# Patient Record
Sex: MALE | Race: WHITE | NOT HISPANIC OR LATINO | URBAN - METROPOLITAN AREA
[De-identification: names, ages, dates, MRNs, and addresses within clinical notes are randomized per-mention and may not be internally consistent; named-entity substitution may affect disease eponyms.]

---

## 2020-11-19 ENCOUNTER — INPATIENT (INPATIENT)
Facility: HOSPITAL | Age: 50
LOS: 3 days | Discharge: NURSING, REHAB OR LONG-TERM CARE | DRG: 720 | End: 2020-11-23
Attending: INTERNAL MEDICINE | Admitting: INTERNAL MEDICINE
Payer: MEDICAID

## 2020-11-19 VITALS — HEIGHT: 70 IN | WEIGHT: 154.98 LBS

## 2020-11-19 LAB
ALBUMIN SERPL ELPH-MCNC: 2.6 G/DL — LOW (ref 3.3–5)
ALP SERPL-CCNC: 37 U/L — LOW (ref 40–120)
ALT FLD-CCNC: 58 U/L — SIGNIFICANT CHANGE UP (ref 12–78)
ANION GAP SERPL CALC-SCNC: 5 MMOL/L — SIGNIFICANT CHANGE UP (ref 5–17)
APTT BLD: 33.8 SEC — SIGNIFICANT CHANGE UP (ref 27.5–35.5)
AST SERPL-CCNC: 45 U/L — HIGH (ref 15–37)
BASOPHILS # BLD AUTO: 0.03 K/UL — SIGNIFICANT CHANGE UP (ref 0–0.2)
BASOPHILS NFR BLD AUTO: 0.3 % — SIGNIFICANT CHANGE UP (ref 0–2)
BILIRUB SERPL-MCNC: 0.8 MG/DL — SIGNIFICANT CHANGE UP (ref 0.2–1.2)
BUN SERPL-MCNC: 8 MG/DL — SIGNIFICANT CHANGE UP (ref 7–23)
CALCIUM SERPL-MCNC: 9.1 MG/DL — SIGNIFICANT CHANGE UP (ref 8.5–10.1)
CHLORIDE SERPL-SCNC: 101 MMOL/L — SIGNIFICANT CHANGE UP (ref 96–108)
CO2 SERPL-SCNC: 28 MMOL/L — SIGNIFICANT CHANGE UP (ref 22–31)
CREAT SERPL-MCNC: 0.88 MG/DL — SIGNIFICANT CHANGE UP (ref 0.5–1.3)
EOSINOPHIL # BLD AUTO: 0 K/UL — SIGNIFICANT CHANGE UP (ref 0–0.5)
EOSINOPHIL NFR BLD AUTO: 0 % — SIGNIFICANT CHANGE UP (ref 0–6)
GLUCOSE SERPL-MCNC: 147 MG/DL — HIGH (ref 70–99)
HCT VFR BLD CALC: 31.1 % — LOW (ref 39–50)
HGB BLD-MCNC: 9.9 G/DL — LOW (ref 13–17)
IMM GRANULOCYTES NFR BLD AUTO: 0.9 % — SIGNIFICANT CHANGE UP (ref 0–1.5)
INR BLD: 1.44 RATIO — HIGH (ref 0.88–1.16)
LACTATE SERPL-SCNC: 2.7 MMOL/L — HIGH (ref 0.7–2)
LYMPHOCYTES # BLD AUTO: 1.41 K/UL — SIGNIFICANT CHANGE UP (ref 1–3.3)
LYMPHOCYTES # BLD AUTO: 12.7 % — LOW (ref 13–44)
MCHC RBC-ENTMCNC: 28.8 PG — SIGNIFICANT CHANGE UP (ref 27–34)
MCHC RBC-ENTMCNC: 31.8 GM/DL — LOW (ref 32–36)
MCV RBC AUTO: 90.4 FL — SIGNIFICANT CHANGE UP (ref 80–100)
MONOCYTES # BLD AUTO: 0.51 K/UL — SIGNIFICANT CHANGE UP (ref 0–0.9)
MONOCYTES NFR BLD AUTO: 4.6 % — SIGNIFICANT CHANGE UP (ref 2–14)
NEUTROPHILS # BLD AUTO: 9.04 K/UL — HIGH (ref 1.8–7.4)
NEUTROPHILS NFR BLD AUTO: 81.5 % — HIGH (ref 43–77)
PLATELET # BLD AUTO: 351 K/UL — SIGNIFICANT CHANGE UP (ref 150–400)
POTASSIUM SERPL-MCNC: 4.2 MMOL/L — SIGNIFICANT CHANGE UP (ref 3.5–5.3)
POTASSIUM SERPL-SCNC: 4.2 MMOL/L — SIGNIFICANT CHANGE UP (ref 3.5–5.3)
PROT SERPL-MCNC: 8 GM/DL — SIGNIFICANT CHANGE UP (ref 6–8.3)
PROTHROM AB SERPL-ACNC: 16.4 SEC — HIGH (ref 10.6–13.6)
RBC # BLD: 3.44 M/UL — LOW (ref 4.2–5.8)
RBC # FLD: 15.2 % — HIGH (ref 10.3–14.5)
SODIUM SERPL-SCNC: 134 MMOL/L — LOW (ref 135–145)
WBC # BLD: 11.09 K/UL — HIGH (ref 3.8–10.5)
WBC # FLD AUTO: 11.09 K/UL — HIGH (ref 3.8–10.5)

## 2020-11-19 PROCEDURE — 93010 ELECTROCARDIOGRAM REPORT: CPT

## 2020-11-19 PROCEDURE — 71045 X-RAY EXAM CHEST 1 VIEW: CPT | Mod: 26

## 2020-11-19 RX ORDER — SODIUM CHLORIDE 9 MG/ML
1000 INJECTION INTRAMUSCULAR; INTRAVENOUS; SUBCUTANEOUS ONCE
Refills: 0 | Status: COMPLETED | OUTPATIENT
Start: 2020-11-19 | End: 2020-11-19

## 2020-11-19 RX ORDER — ACETAMINOPHEN 500 MG
650 TABLET ORAL ONCE
Refills: 0 | Status: COMPLETED | OUTPATIENT
Start: 2020-11-19 | End: 2020-11-19

## 2020-11-19 RX ADMIN — SODIUM CHLORIDE 1000 MILLILITER(S): 9 INJECTION INTRAMUSCULAR; INTRAVENOUS; SUBCUTANEOUS at 23:56

## 2020-11-19 RX ADMIN — SODIUM CHLORIDE 1000 MILLILITER(S): 9 INJECTION INTRAMUSCULAR; INTRAVENOUS; SUBCUTANEOUS at 22:56

## 2020-11-19 RX ADMIN — SODIUM CHLORIDE 1000 MILLILITER(S): 9 INJECTION INTRAMUSCULAR; INTRAVENOUS; SUBCUTANEOUS at 23:57

## 2020-11-19 RX ADMIN — SODIUM CHLORIDE 1000 MILLILITER(S): 9 INJECTION INTRAMUSCULAR; INTRAVENOUS; SUBCUTANEOUS at 22:57

## 2020-11-19 RX ADMIN — Medication 650 MILLIGRAM(S): at 23:20

## 2020-11-19 NOTE — ED PROVIDER NOTE - CLINICAL SUMMARY MEDICAL DECISION MAKING FREE TEXT BOX
vague complaints of generalized weakness and questionable hyponatremia, awaiting labs, xray.  tachycardic and low bp, will hydrate, treat fever

## 2020-11-19 NOTE — ED PROVIDER NOTE - CARE PLAN
Principal Discharge DX:	Hypotension  Secondary Diagnosis:	Sepsis  Secondary Diagnosis:	Elevated troponin

## 2020-11-19 NOTE — ED PROVIDER NOTE - CPE EDP EYES NORM
-- DO NOT REPLY / DO NOT REPLY ALL --  -- Message is from the [facility]--    General Patient Message      Reason for Call: Nel therapist is calling with mom present in regards to MRI order. Patient has an appointment This wednesdays 1/29/2020 for MRI. Order for MRI was denied by doctor. Please contact as soon as possible.     Mom's telephone number: [phone]  Nel therapist telephone number: [phone]    Caller Information       Type Contact Phone    01/27/2020 09:42 AM Phone (Incoming) NEL (Other) [phone]     THERAPIST          Alternative phone number: none    Turnaround time given to caller:   \"This message will be sent to [state Provider's name]. The clinical team will fulfill your request as soon as they review your message.\"}     normal...

## 2020-11-19 NOTE — ED PROVIDER NOTE - PROGRESS NOTE DETAILS
pt remains hypotensive, despite 2L NS and a third running. Patient is asymptomatic, no c/o dizziness, chest pain, sob.  In CT for more information.  Will start Levophed and call ICU consult d/w Dr Montoya, intensivist; will admit to ICU on Levophed

## 2020-11-19 NOTE — ED ADULT NURSE NOTE - OBJECTIVE STATEMENT
patient axox3, c/o abnormal labs. as per patient, patient had outpatient labs completed and found to have low sodium, patient unsure of sodium result. patient c/o weakness. patient denies headache, n/v/d, chills, cough, chest pain, SOB. patient axox3, c/o abnormal labs. as per patient, patient had outpatient labs completed and found to have low sodium, patient unsure of sodium result. patient c/o increasing weakness. patient denies headache, n/v/d, chills, cough, chest pain, SOB. patient states he was recently seen at [facility] for "bubbling" in his stomach, patient states his stools are light in color. patient denies abdominal pain.

## 2020-11-19 NOTE — ED STATDOCS - PROGRESS NOTE DETAILS
Esteban Lazcano for attending Dr. Bourne: 49 y/o male with no significant PMHx presents to the ED regarding abnormal labs. Pt was seen at [facility] several days ago for "bubbles in my stomach." Pt had outpatient labs, found to have low sodium. Denies current abd pain or any symptoms. Given pt febrile and tachycardic, will send pt to main ED for further evaluation.

## 2020-11-19 NOTE — ED PROVIDER NOTE - OBJECTIVE STATEMENT
51yo male with h/o asthma c/o generalized weakness. Patient states that for the past 3 months he has had a sensation of "bubbling" in the stomach with bloating and increased belching, loss of appetite and a weight loss of 20lb.  No abdominal pain.  Noticed his stools were light in color and floated.  On October 18 he started to have increased general malaise and fatigue.  +cough.  No vomiting or diarrhea.  No known fever.  He went to [facility] on Tuesday, was told he needed to come immediately to the ED for IVF because his sodium was low, but didn't have a ride, so he came today.  Former smoker, still smokes "sometimes"; denies alcohol and drug use. Has no PMD

## 2020-11-19 NOTE — ED ADULT TRIAGE NOTE - CHIEF COMPLAINT QUOTE
Patient presents to ED complaining of low Na on outpatient labs. Pt endorses feeling weak. Denies chest pain, SOB.

## 2020-11-20 DIAGNOSIS — I95.9 HYPOTENSION, UNSPECIFIED: ICD-10-CM

## 2020-11-20 LAB
-  STREPTOCOCCUS SP. (NOT GRP A, B OR S PNEUMONIAE): SIGNIFICANT CHANGE UP
ADD ON TEST-SPECIMEN IN LAB: SIGNIFICANT CHANGE UP
ADD ON TEST-SPECIMEN IN LAB: SIGNIFICANT CHANGE UP
ALBUMIN SERPL ELPH-MCNC: 2.2 G/DL — LOW (ref 3.3–5)
ALP SERPL-CCNC: 36 U/L — LOW (ref 40–120)
ALT FLD-CCNC: 46 U/L — SIGNIFICANT CHANGE UP (ref 12–78)
ANION GAP SERPL CALC-SCNC: 6 MMOL/L — SIGNIFICANT CHANGE UP (ref 5–17)
APPEARANCE UR: CLEAR — SIGNIFICANT CHANGE UP
AST SERPL-CCNC: 38 U/L — HIGH (ref 15–37)
BACTERIA # UR AUTO: ABNORMAL
BASOPHILS # BLD AUTO: 0.03 K/UL — SIGNIFICANT CHANGE UP (ref 0–0.2)
BASOPHILS NFR BLD AUTO: 0.3 % — SIGNIFICANT CHANGE UP (ref 0–2)
BILIRUB SERPL-MCNC: 0.7 MG/DL — SIGNIFICANT CHANGE UP (ref 0.2–1.2)
BILIRUB UR-MCNC: NEGATIVE — SIGNIFICANT CHANGE UP
BUN SERPL-MCNC: 5 MG/DL — LOW (ref 7–23)
CALCIUM SERPL-MCNC: 8.7 MG/DL — SIGNIFICANT CHANGE UP (ref 8.5–10.1)
CHLORIDE SERPL-SCNC: 109 MMOL/L — HIGH (ref 96–108)
CO2 SERPL-SCNC: 25 MMOL/L — SIGNIFICANT CHANGE UP (ref 22–31)
COLOR SPEC: YELLOW — SIGNIFICANT CHANGE UP
CREAT SERPL-MCNC: 0.5 MG/DL — SIGNIFICANT CHANGE UP (ref 0.5–1.3)
DIFF PNL FLD: NEGATIVE — SIGNIFICANT CHANGE UP
EOSINOPHIL # BLD AUTO: 0 K/UL — SIGNIFICANT CHANGE UP (ref 0–0.5)
EOSINOPHIL NFR BLD AUTO: 0 % — SIGNIFICANT CHANGE UP (ref 0–6)
EPI CELLS # UR: NEGATIVE — SIGNIFICANT CHANGE UP
GLUCOSE SERPL-MCNC: 125 MG/DL — HIGH (ref 70–99)
GLUCOSE UR QL: NEGATIVE MG/DL — SIGNIFICANT CHANGE UP
GRAM STN FLD: SIGNIFICANT CHANGE UP
GRAM STN FLD: SIGNIFICANT CHANGE UP
HCT VFR BLD CALC: 30 % — LOW (ref 39–50)
HGB BLD-MCNC: 9.2 G/DL — LOW (ref 13–17)
IMM GRANULOCYTES NFR BLD AUTO: 1.2 % — SIGNIFICANT CHANGE UP (ref 0–1.5)
KETONES UR-MCNC: NEGATIVE — SIGNIFICANT CHANGE UP
LACTATE SERPL-SCNC: 1 MMOL/L — SIGNIFICANT CHANGE UP (ref 0.7–2)
LEUKOCYTE ESTERASE UR-ACNC: NEGATIVE — SIGNIFICANT CHANGE UP
LYMPHOCYTES # BLD AUTO: 1.21 K/UL — SIGNIFICANT CHANGE UP (ref 1–3.3)
LYMPHOCYTES # BLD AUTO: 10.7 % — LOW (ref 13–44)
MAGNESIUM SERPL-MCNC: 2.1 MG/DL — SIGNIFICANT CHANGE UP (ref 1.6–2.6)
MCHC RBC-ENTMCNC: 27.8 PG — SIGNIFICANT CHANGE UP (ref 27–34)
MCHC RBC-ENTMCNC: 30.7 GM/DL — LOW (ref 32–36)
MCV RBC AUTO: 90.6 FL — SIGNIFICANT CHANGE UP (ref 80–100)
METHOD TYPE: SIGNIFICANT CHANGE UP
MONOCYTES # BLD AUTO: 0.78 K/UL — SIGNIFICANT CHANGE UP (ref 0–0.9)
MONOCYTES NFR BLD AUTO: 6.9 % — SIGNIFICANT CHANGE UP (ref 2–14)
NEUTROPHILS # BLD AUTO: 9.11 K/UL — HIGH (ref 1.8–7.4)
NEUTROPHILS NFR BLD AUTO: 80.9 % — HIGH (ref 43–77)
NITRITE UR-MCNC: NEGATIVE — SIGNIFICANT CHANGE UP
PH UR: 6.5 — SIGNIFICANT CHANGE UP (ref 5–8)
PHOSPHATE SERPL-MCNC: 3.4 MG/DL — SIGNIFICANT CHANGE UP (ref 2.5–4.5)
PLATELET # BLD AUTO: 302 K/UL — SIGNIFICANT CHANGE UP (ref 150–400)
POTASSIUM SERPL-MCNC: 3.8 MMOL/L — SIGNIFICANT CHANGE UP (ref 3.5–5.3)
POTASSIUM SERPL-SCNC: 3.8 MMOL/L — SIGNIFICANT CHANGE UP (ref 3.5–5.3)
PROCALCITONIN SERPL-MCNC: 0.13 NG/ML — HIGH (ref 0.02–0.1)
PROT SERPL-MCNC: 7 GM/DL — SIGNIFICANT CHANGE UP (ref 6–8.3)
PROT UR-MCNC: 15 MG/DL
RAPID RVP RESULT: SIGNIFICANT CHANGE UP
RBC # BLD: 3.31 M/UL — LOW (ref 4.2–5.8)
RBC # FLD: 15.3 % — HIGH (ref 10.3–14.5)
RBC CASTS # UR COMP ASSIST: SIGNIFICANT CHANGE UP /HPF (ref 0–4)
SARS-COV-2 RNA SPEC QL NAA+PROBE: SIGNIFICANT CHANGE UP
SODIUM SERPL-SCNC: 140 MMOL/L — SIGNIFICANT CHANGE UP (ref 135–145)
SP GR SPEC: 1 — LOW (ref 1.01–1.02)
SPECIMEN SOURCE: SIGNIFICANT CHANGE UP
SPECIMEN SOURCE: SIGNIFICANT CHANGE UP
UROBILINOGEN FLD QL: 1 MG/DL
WBC # BLD: 11.26 K/UL — HIGH (ref 3.8–10.5)
WBC # FLD AUTO: 11.26 K/UL — HIGH (ref 3.8–10.5)
WBC UR QL: NEGATIVE — SIGNIFICANT CHANGE UP

## 2020-11-20 PROCEDURE — 87086 URINE CULTURE/COLONY COUNT: CPT

## 2020-11-20 PROCEDURE — 36415 COLL VENOUS BLD VENIPUNCTURE: CPT

## 2020-11-20 PROCEDURE — 93306 TTE W/DOPPLER COMPLETE: CPT | Mod: 26

## 2020-11-20 PROCEDURE — 82533 TOTAL CORTISOL: CPT

## 2020-11-20 PROCEDURE — 74177 CT ABD & PELVIS W/CONTRAST: CPT | Mod: 26

## 2020-11-20 PROCEDURE — 71275 CT ANGIOGRAPHY CHEST: CPT | Mod: 26

## 2020-11-20 PROCEDURE — 93306 TTE W/DOPPLER COMPLETE: CPT

## 2020-11-20 PROCEDURE — 94640 AIRWAY INHALATION TREATMENT: CPT

## 2020-11-20 PROCEDURE — 93312 ECHO TRANSESOPHAGEAL: CPT

## 2020-11-20 PROCEDURE — 70450 CT HEAD/BRAIN W/O DYE: CPT | Mod: 26

## 2020-11-20 PROCEDURE — 83735 ASSAY OF MAGNESIUM: CPT

## 2020-11-20 PROCEDURE — 81001 URINALYSIS AUTO W/SCOPE: CPT

## 2020-11-20 PROCEDURE — 84145 PROCALCITONIN (PCT): CPT

## 2020-11-20 PROCEDURE — 87040 BLOOD CULTURE FOR BACTERIA: CPT

## 2020-11-20 PROCEDURE — 85025 COMPLETE CBC W/AUTO DIFF WBC: CPT

## 2020-11-20 PROCEDURE — 85027 COMPLETE CBC AUTOMATED: CPT

## 2020-11-20 PROCEDURE — 84484 ASSAY OF TROPONIN QUANT: CPT

## 2020-11-20 PROCEDURE — 80053 COMPREHEN METABOLIC PANEL: CPT

## 2020-11-20 PROCEDURE — 80048 BASIC METABOLIC PNL TOTAL CA: CPT

## 2020-11-20 PROCEDURE — 99291 CRITICAL CARE FIRST HOUR: CPT

## 2020-11-20 PROCEDURE — 86769 SARS-COV-2 COVID-19 ANTIBODY: CPT

## 2020-11-20 PROCEDURE — 84100 ASSAY OF PHOSPHORUS: CPT

## 2020-11-20 RX ORDER — CHLORHEXIDINE GLUCONATE 213 G/1000ML
1 SOLUTION TOPICAL
Refills: 0 | Status: DISCONTINUED | OUTPATIENT
Start: 2020-11-20 | End: 2020-11-23

## 2020-11-20 RX ORDER — CEFEPIME 1 G/1
1000 INJECTION, POWDER, FOR SOLUTION INTRAMUSCULAR; INTRAVENOUS EVERY 12 HOURS
Refills: 0 | Status: DISCONTINUED | OUTPATIENT
Start: 2020-11-20 | End: 2020-11-20

## 2020-11-20 RX ORDER — CEFEPIME 1 G/1
1000 INJECTION, POWDER, FOR SOLUTION INTRAMUSCULAR; INTRAVENOUS EVERY 8 HOURS
Refills: 0 | Status: DISCONTINUED | OUTPATIENT
Start: 2020-11-20 | End: 2020-11-21

## 2020-11-20 RX ORDER — CEFEPIME 1 G/1
1000 INJECTION, POWDER, FOR SOLUTION INTRAMUSCULAR; INTRAVENOUS ONCE
Refills: 0 | Status: COMPLETED | OUTPATIENT
Start: 2020-11-20 | End: 2020-11-20

## 2020-11-20 RX ORDER — CEFEPIME 1 G/1
1000 INJECTION, POWDER, FOR SOLUTION INTRAMUSCULAR; INTRAVENOUS EVERY 8 HOURS
Refills: 0 | Status: DISCONTINUED | OUTPATIENT
Start: 2020-11-20 | End: 2020-11-20

## 2020-11-20 RX ORDER — ALBUTEROL 90 UG/1
1 AEROSOL, METERED ORAL EVERY 6 HOURS
Refills: 0 | Status: DISCONTINUED | OUTPATIENT
Start: 2020-11-20 | End: 2020-11-23

## 2020-11-20 RX ORDER — ENOXAPARIN SODIUM 100 MG/ML
40 INJECTION SUBCUTANEOUS DAILY
Refills: 0 | Status: DISCONTINUED | OUTPATIENT
Start: 2020-11-20 | End: 2020-11-23

## 2020-11-20 RX ORDER — CEFEPIME 1 G/1
INJECTION, POWDER, FOR SOLUTION INTRAMUSCULAR; INTRAVENOUS
Refills: 0 | Status: DISCONTINUED | OUTPATIENT
Start: 2020-11-20 | End: 2020-11-20

## 2020-11-20 RX ORDER — SODIUM CHLORIDE 9 MG/ML
1000 INJECTION, SOLUTION INTRAVENOUS
Refills: 0 | Status: DISCONTINUED | OUTPATIENT
Start: 2020-11-20 | End: 2020-11-21

## 2020-11-20 RX ORDER — VANCOMYCIN HCL 1 G
1000 VIAL (EA) INTRAVENOUS ONCE
Refills: 0 | Status: COMPLETED | OUTPATIENT
Start: 2020-11-20 | End: 2020-11-20

## 2020-11-20 RX ORDER — NOREPINEPHRINE BITARTRATE/D5W 8 MG/250ML
0.05 PLASTIC BAG, INJECTION (ML) INTRAVENOUS
Qty: 8 | Refills: 0 | Status: DISCONTINUED | OUTPATIENT
Start: 2020-11-20 | End: 2020-11-23

## 2020-11-20 RX ADMIN — SODIUM CHLORIDE 1000 MILLILITER(S): 9 INJECTION INTRAMUSCULAR; INTRAVENOUS; SUBCUTANEOUS at 00:21

## 2020-11-20 RX ADMIN — CEFEPIME 1000 MILLIGRAM(S): 1 INJECTION, POWDER, FOR SOLUTION INTRAMUSCULAR; INTRAVENOUS at 08:10

## 2020-11-20 RX ADMIN — SODIUM CHLORIDE 125 MILLILITER(S): 9 INJECTION, SOLUTION INTRAVENOUS at 04:00

## 2020-11-20 RX ADMIN — ALBUTEROL 1 PUFF(S): 90 AEROSOL, METERED ORAL at 14:22

## 2020-11-20 RX ADMIN — SODIUM CHLORIDE 125 MILLILITER(S): 9 INJECTION, SOLUTION INTRAVENOUS at 16:35

## 2020-11-20 RX ADMIN — CEFEPIME 1000 MILLIGRAM(S): 1 INJECTION, POWDER, FOR SOLUTION INTRAMUSCULAR; INTRAVENOUS at 16:35

## 2020-11-20 RX ADMIN — Medication 6.59 MICROGRAM(S)/KG/MIN: at 00:53

## 2020-11-20 RX ADMIN — CHLORHEXIDINE GLUCONATE 1 APPLICATION(S): 213 SOLUTION TOPICAL at 06:50

## 2020-11-20 RX ADMIN — CEFEPIME 1000 MILLIGRAM(S): 1 INJECTION, POWDER, FOR SOLUTION INTRAMUSCULAR; INTRAVENOUS at 23:16

## 2020-11-20 RX ADMIN — Medication 250 MILLIGRAM(S): at 01:11

## 2020-11-20 RX ADMIN — ALBUTEROL 1 PUFF(S): 90 AEROSOL, METERED ORAL at 20:18

## 2020-11-20 RX ADMIN — ALBUTEROL 1 PUFF(S): 90 AEROSOL, METERED ORAL at 08:13

## 2020-11-20 RX ADMIN — CEFEPIME 1000 MILLIGRAM(S): 1 INJECTION, POWDER, FOR SOLUTION INTRAMUSCULAR; INTRAVENOUS at 01:10

## 2020-11-20 RX ADMIN — ENOXAPARIN SODIUM 40 MILLIGRAM(S): 100 INJECTION SUBCUTANEOUS at 10:33

## 2020-11-20 RX ADMIN — Medication 650 MILLIGRAM(S): at 01:31

## 2020-11-20 RX ADMIN — Medication 6.59 MICROGRAM(S)/KG/MIN: at 08:10

## 2020-11-20 NOTE — H&P ADULT - ASSESSMENT
Impression:  1. Shock  2. Sepsis  3. Lactic acidosis   4. Elevated troponin   5. Asthma    - s/p IVF resuscitation. Started on levophed, titrating to maintain MAP >65. Ordered for formal TTE.  - Broad spectrum abx w/ cefepime. Received 1 dose Vanc in ED. Send Procal. Blood and urien cultures are pending.  - Meets sepsis criteria, unclear source of sepsis, UA clean/ CXR w/out consolidation /infiltrate. CT C/A/P w/out evidence of source of infection. RVP and COVID-19 negative.   - tylenol PRN for fever   - Elevated troponin, likely demand ischemia. No EKG change. Trend q8hr   - Lactic acidosis, s/p IVFs. Now cleared  - IVF hydration w/ LR   - Restart home bronchodilators

## 2020-11-20 NOTE — H&P ADULT - NEGATIVE NEUROLOGICAL SYMPTOMS
no focal seizures/no syncope/no weakness/no paresthesias/no generalized seizures/no transient paralysis

## 2020-11-20 NOTE — CHART NOTE - NSCHARTNOTEFT_GEN_A_CORE
A provider-focused exam of reperfusion assessment was completed after fluid bolus. Remained hypotensive after fluid resuscitation. Started on levophed.     T(C): 36.6 (11-20-20 @ 04:00), Max: 38 (11-19-20 @ 21:52)  HR: 93 (11-20-20 @ 07:00) (90 - 140)  BP: 109/53 (11-20-20 @ 07:00) (70/32 - 123/55)  RR: 40 (11-20-20 @ 07:00) (18 - 40)  SpO2: 98% (11-20-20 @ 07:00) (94% - 100%)    Resp: clear to auscultation, no rales, wheezing, or rhonchi  Cardiac: S1, S2, no murmurs appreciated    Capillary refill time is less than 2 seconds.     Peripheral pulses are + 2 Radial bilaterally and + 2 DP bilaterally    Skin: normal turgor    Continue present care.

## 2020-11-20 NOTE — ED ADULT NURSE REASSESSMENT NOTE - NS ED NURSE REASSESS COMMENT FT1
patient diaphoretic. bp 86/51. patient denies headache, dizziness, chest pain, SOB. dr. james made aware. levophed to be started. patient safety maintained. will continue to monitor.

## 2020-11-20 NOTE — PROGRESS NOTE ADULT - SUBJECTIVE AND OBJECTIVE BOX
CC:    HPI:  49 y/o male pmhx asthma presented to ED tonight after being told to come by Urgent care for hyponatremia, on arrival to ED was found to be hypotensive. Patient admits to having some non specific symptoms of abdominal bloating, loss of appeitite and almost 20lb weight loss over past 3 weeks. Denies any abdominal pain. States 3 weeks ago, he started having malaise and non productive cough. Denies any SOB/ CP, abdominal pain, N/V, diarehha, fever/chills. In ED hypotensive despite 3L IVF, started on levophed, he was febrile as well. Admitted to ICU.      : PAtient seen in the ICU this morning.  On levophes, Cultures are negative thus far.  Patient admits to a significant wt loss unintentionally        PAST MEDICAL & SURGICAL HISTORY:  Asthma    No significant past surgical history        FAMILY HISTORY:      Social Hx:    Allergies    No Known Allergies    Intolerances        Height (cm): 177.8 ( @ 21:50)  Weight (kg): 70.3 ( @ 21:50)  BMI (kg/m2): 22.2 ( @ 21:50)    ICU Vital Signs Last 24 Hrs  T(C): 36.9 (2020 12:00), Max: 38 (2020 21:52)  T(F): 98.5 (2020 12:00), Max: 100.4 (2020 21:52)  HR: 103 (2020 12:00) (90 - 140)  BP: 92/53 (2020 12:00) (70/32 - 123/66)  BP(mean): 65 (2020 12:00) (45 - 83)  ABP: --  ABP(mean): --  RR: 35 (2020 12:00) (18 - 40)  SpO2: 98% (2020 12:00) (94% - 100%)          I&O's Summary    2020 07:01  -  2020 07:00  --------------------------------------------------------  IN: 0 mL / OUT: 300 mL / NET: -300 mL    2020 07:01  -  2020 12:42  --------------------------------------------------------  IN: 0 mL / OUT: 600 mL / NET: -600 mL                              9.2    11.26 )-----------( 302      ( 2020 06:38 )             30.0       11-20    140  |  109<H>  |  5<L>  ----------------------------<  125<H>  3.8   |  25  |  0.50    Ca    8.7      2020 06:38  Phos  3.4       Mg     2.1         TPro  7.0  /  Alb  2.2<L>  /  TBili  0.7  /  DBili  x   /  AST  38<H>  /  ALT  46  /  AlkPhos  36<L>  11-20      CARDIAC MARKERS ( 2020 06:38 )  0.124 ng/mL / x     / x     / x     / x      CARDIAC MARKERS ( 2020 22:12 )  0.067 ng/mL / x     / x     / x     / x                Urinalysis Basic - ( 2020 01:27 )    Color: Yellow / Appearance: Clear / S.005 / pH: x  Gluc: x / Ketone: Negative  / Bili: Negative / Urobili: 1 mg/dL   Blood: x / Protein: 15 mg/dL / Nitrite: Negative   Leuk Esterase: Negative / RBC: 0-2 /HPF / WBC Negative   Sq Epi: x / Non Sq Epi: Negative / Bacteria: Occasional        MEDICATIONS  (STANDING):  ALBUTerol    90 MICROgram(s) HFA Inhaler 1 Puff(s) Inhalation every 6 hours  cefepime  Injectable. 1000 milliGRAM(s) IV Push every 8 hours  chlorhexidine 4% Liquid 1 Application(s) Topical <User Schedule>  enoxaparin Injectable 40 milliGRAM(s) SubCutaneous daily  lactated ringers. 1000 milliLiter(s) (125 mL/Hr) IV Continuous <Continuous>  norepinephrine Infusion 0.05 MICROgram(s)/kG/Min (6.59 mL/Hr) IV Continuous <Continuous>    MEDICATIONS  (PRN):      DVT Prophylaxis:    Advanced Directives:  Discussed with:    Visit Information: 30 min    ** Time is exclusive of billed procedures and/or teaching and/or routine family updates.

## 2020-11-20 NOTE — CONSULT NOTE ADULT - SUBJECTIVE AND OBJECTIVE BOX
REASON FOR CONSULT: Hypotension, Sepsis    Chief Complaint    HPI:  · Chief Complaint: The patient is a 50y Male complaining of abnormal lab result.  · HPI Objective Statement: 51yo male with h/o asthma c/o generalized weakness. Patient states that for the past 3 months he has had a sensation of "bubbling" in the stomach with bloating and increased belching, loss of appetite and a weight loss of 20lb.  No abdominal pain.  Noticed his stools were light in color and floated.  On October 18 he started to have increased general malaise and fatigue.  +cough.  No vomiting or diarrhea.  No known fever.  He went to [facility] on Tuesday, was told he needed to come immediately to the ED for IVF because his sodium was low, but didn't have a ride, so he came today.  Former smoker, still smokes "sometimes"; denies alcohol and drug use. Has no PMD  Case was discussed with the ER MD and ICU PA.    PAST MEDICAL & SURGICAL HISTORY:  Asthma    No significant past surgical history      Allergies  No Known Allergies        Medications:  cefepime  Injectable.      cefepime  Injectable. 1000 milliGRAM(s) IV Push every 12 hours  norepinephrine Infusion 0.05 MICROgram(s)/kG/Min IV Continuous <Continuous>  Vancomycin        Vital Signs Last 24 Hrs  T(C): 36.8 (20 Nov 2020 01:31), Max: 38 (19 Nov 2020 21:52)  T(F): 98.3 (20 Nov 2020 01:31), Max: 100.4 (19 Nov 2020 21:52)  HR: 101 (20 Nov 2020 00:52) (101 - 140)  BP: 86/51 (20 Nov 2020 00:52) (83/43 - 103/61)  BP(mean): 62 (20 Nov 2020 00:52) (56 - 74)  RR: 23 (19 Nov 2020 23:56) (18 - 23)  SpO2: 100% (19 Nov 2020 23:56) (95% - 100%)        LABS:                        9.9    11.09 )-----------( 351      ( 19 Nov 2020 22:12 )             31.1     11-19    134<L>  |  101  |  8   ----------------------------<  147<H>  4.2   |  28  |  0.88    Ca    9.1      19 Nov 2020 22:12    TPro  8.0  /  Alb  2.6<L>  /  TBili  0.8  /  DBili  x   /  AST  45<H>  /  ALT  58  /  AlkPhos  37<L>  11-19      CARDIAC MARKERS ( 19 Nov 2020 22:12 )  0.067 ng/mL / x     / x     / x     / x          PT/INR - ( 19 Nov 2020 22:12 )   PT: 16.4 sec;   INR: 1.44 ratio         PTT - ( 19 Nov 2020 22:12 )  PTT:33.8 sec    CULTURES: sent      Physical Examination:  Physical exam as per bedside MD (direct physical examination could not be performed because the visit was provided via Telemedicine):       RADIOLOGY: Results pending.    IMP-  Hypotension  dehydration  sepsis  elevated Troponin and Lactate      Plan-  Levophed for Hemodynamic support  Admit to ICU  Cefepime and Vancomycin empiric  f/u cultures  f/u CT result    CRITICAL CARE TIME SPENT: 40    This visit was provided via telemedicine. Patient was located at     [facility]  Provider was located at [facility] [place] for the visit.

## 2020-11-20 NOTE — H&P ADULT - NSHPLABSRESULTS_GEN_ALL_CORE
< from: CT Abdomen and Pelvis w/ IV Cont (11.20.20 @ 01:12) >    EXAM:  CT ABDOMEN AND PELVIS IC                          EXAM:  CTA CHEST PE PROTOCOL (W)AW IC                            PROCEDURE DATE:  11/20/2020          INTERPRETATION:  CT ANGIO CHEST PULMONARY EMBOLISM WITH IV CONTRAST  CT ABDOMEN AND PELVIS WITH IV CONTRAST    INDICATION: Tachycardia, hypotension and weakness    TECHNIQUE: Contrast enhanced CT imaging of the chest, abdomen and pelvis. Timing optimized for the pulmonary arteries. Sagittal and coronal reformat images are provided. Postprocessed MIP reformatted images were created and reviewed.    90 mL of Omnipaque 350 contrast material was injected IV.    COMPARISON: CT abdomen pelvis 10/5/2003..    FINDINGS:    Pulmonary arteries: No filling defects to suggest pulmonary emboli though detailed evaluation of the subsegmental branches limited secondary to suboptimal contrast bolus within these vessels and respiratory motion artifact.  Aorta: Atherosclerotic disease of the aorta and its branches.    Lines and tubes: None.  Airways:Patent central airways.  Lungs and Pleura: No consolidation, pleural effusion or pneumothorax. Mild bibasilar dependent atelectasis.  Lymph nodes: No mediastinal adenopathy. No hilar or axillary adenopathy.  Heart: Borderline cardiac silhouette. No pericardial effusion.    Liver: Enlarged fatty liver measuring 22 cm in length. Too small to characterize hepatic hypodensities.  Biliary: No dilatation. Partially contracted gallbladder.  Spleen: No suspicious lesions.  Pancreas: No inflammatory changes or ductal dilatation.  Adrenals: Normal.  Kidneys: No hydronephrosis. Too small to characterize bilateral renal hypodensities. Punctate nonobstructive stone in the inferior pole of left kidney.  Vessels: Normal aortic caliber. Atherosclerotic disease of the aorta and its branches.    GI tract: Small hiatal hernia or wall thickening of the distal thoracic esophagus for which nonemergent upper endoscopic evaluation is advised. No evidence of small bowel obstruction. No significant bowel wall thickening or inflammatory changes though detailed evaluation of GI tract is limited secondary to inadequate distention. Mild colonic diverticulosis without evidence of acute diverticular colitis. Normal appendix.    Peritoneum/retroperitoneum and mesentery: No free air. No organized fluid collection. No adenopathy.    Pelvic organs/Bladder: No prostatomegaly. Mild bladder wall thickening, difficult to evaluate secondary to inadequate distention.    Abdominal wall: A small fat containing umbilical andright groin hernia is noted.  Bones and soft tissues: Mild multilevel degenerative changes of the spine noted.    IMPRESSION:    Negative for pulmonary emboli though detailed evaluation of the subsegmental branches limited secondary to suboptimal contrast bolus within these vessels and respiratory motion artifact..    No evidence of pneumonia or pleural effusion.Mild bibasilar dependent atelectasis.    Small hiatal hernia or wall thickening of the distal thoracic esophagus for which nonemergent upper endoscopic evaluation is advised.    No acute bowel inflammatory changes or obstruction.    Normal appendix.    Mild bladder wall thickening, difficult to evaluate secondary to inadequate distention. Correlate with urinalysis and laboratory values to assess for cystitis in appropriate clinical setting.    Additional findings as mentioned above.    < end of copied text >

## 2020-11-20 NOTE — PROGRESS NOTE ADULT - ASSESSMENT
A/P: 50 male who presented with hypotension and presumed to be from sepsis    Plan:  ICU    PULM: No active issues    Cardio: Hypotension may be from septic shock, wean off Levo, Check ECHO, Cardio Consult    Renal: Continue LR at 125    GI: PO Diet, Will need an EGD for CT findings    ID: Cultures still PND, Continue Cefepime

## 2020-11-20 NOTE — H&P ADULT - RS GEN PE MLT RESP DETAILS PC
breath sounds equal/normal/airway patent/clear to auscultation bilaterally/good air movement/respirations non-labored
no

## 2020-11-21 DIAGNOSIS — A40.9 STREPTOCOCCAL SEPSIS, UNSPECIFIED: ICD-10-CM

## 2020-11-21 DIAGNOSIS — I35.1 NONRHEUMATIC AORTIC (VALVE) INSUFFICIENCY: ICD-10-CM

## 2020-11-21 DIAGNOSIS — R77.8 OTHER SPECIFIED ABNORMALITIES OF PLASMA PROTEINS: ICD-10-CM

## 2020-11-21 LAB
ANION GAP SERPL CALC-SCNC: 6 MMOL/L — SIGNIFICANT CHANGE UP (ref 5–17)
BUN SERPL-MCNC: 5 MG/DL — LOW (ref 7–23)
CALCIUM SERPL-MCNC: 8.6 MG/DL — SIGNIFICANT CHANGE UP (ref 8.5–10.1)
CHLORIDE SERPL-SCNC: 104 MMOL/L — SIGNIFICANT CHANGE UP (ref 96–108)
CO2 SERPL-SCNC: 26 MMOL/L — SIGNIFICANT CHANGE UP (ref 22–31)
CREAT SERPL-MCNC: 0.47 MG/DL — LOW (ref 0.5–1.3)
CULTURE RESULTS: SIGNIFICANT CHANGE UP
GLUCOSE SERPL-MCNC: 102 MG/DL — HIGH (ref 70–99)
GRAM STN FLD: SIGNIFICANT CHANGE UP
GRAM STN FLD: SIGNIFICANT CHANGE UP
HCT VFR BLD CALC: 27.5 % — LOW (ref 39–50)
HGB BLD-MCNC: 8.7 G/DL — LOW (ref 13–17)
MAGNESIUM SERPL-MCNC: 1.8 MG/DL — SIGNIFICANT CHANGE UP (ref 1.6–2.6)
MCHC RBC-ENTMCNC: 27.9 PG — SIGNIFICANT CHANGE UP (ref 27–34)
MCHC RBC-ENTMCNC: 31.6 GM/DL — LOW (ref 32–36)
MCV RBC AUTO: 88.1 FL — SIGNIFICANT CHANGE UP (ref 80–100)
PHOSPHATE SERPL-MCNC: 3.4 MG/DL — SIGNIFICANT CHANGE UP (ref 2.5–4.5)
PLATELET # BLD AUTO: 310 K/UL — SIGNIFICANT CHANGE UP (ref 150–400)
POTASSIUM SERPL-MCNC: 3.6 MMOL/L — SIGNIFICANT CHANGE UP (ref 3.5–5.3)
POTASSIUM SERPL-SCNC: 3.6 MMOL/L — SIGNIFICANT CHANGE UP (ref 3.5–5.3)
RBC # BLD: 3.12 M/UL — LOW (ref 4.2–5.8)
RBC # FLD: 15.3 % — HIGH (ref 10.3–14.5)
SARS-COV-2 IGG SERPL QL IA: NEGATIVE — SIGNIFICANT CHANGE UP
SARS-COV-2 IGM SERPL IA-ACNC: <0.1 INDEX — SIGNIFICANT CHANGE UP
SODIUM SERPL-SCNC: 136 MMOL/L — SIGNIFICANT CHANGE UP (ref 135–145)
SPECIMEN SOURCE: SIGNIFICANT CHANGE UP
WBC # BLD: 8.16 K/UL — SIGNIFICANT CHANGE UP (ref 3.8–10.5)
WBC # FLD AUTO: 8.16 K/UL — SIGNIFICANT CHANGE UP (ref 3.8–10.5)

## 2020-11-21 PROCEDURE — 99291 CRITICAL CARE FIRST HOUR: CPT

## 2020-11-21 PROCEDURE — 99223 1ST HOSP IP/OBS HIGH 75: CPT

## 2020-11-21 RX ORDER — VANCOMYCIN HCL 1 G
1000 VIAL (EA) INTRAVENOUS ONCE
Refills: 0 | Status: COMPLETED | OUTPATIENT
Start: 2020-11-21 | End: 2020-11-21

## 2020-11-21 RX ORDER — CEFTRIAXONE 500 MG/1
2000 INJECTION, POWDER, FOR SOLUTION INTRAMUSCULAR; INTRAVENOUS EVERY 24 HOURS
Refills: 0 | Status: DISCONTINUED | OUTPATIENT
Start: 2020-11-21 | End: 2020-11-21

## 2020-11-21 RX ORDER — CEFTRIAXONE 500 MG/1
2000 INJECTION, POWDER, FOR SOLUTION INTRAMUSCULAR; INTRAVENOUS EVERY 24 HOURS
Refills: 0 | Status: DISCONTINUED | OUTPATIENT
Start: 2020-11-21 | End: 2020-11-23

## 2020-11-21 RX ORDER — MIDODRINE HYDROCHLORIDE 2.5 MG/1
5 TABLET ORAL EVERY 8 HOURS
Refills: 0 | Status: DISCONTINUED | OUTPATIENT
Start: 2020-11-21 | End: 2020-11-23

## 2020-11-21 RX ADMIN — CEFEPIME 1000 MILLIGRAM(S): 1 INJECTION, POWDER, FOR SOLUTION INTRAMUSCULAR; INTRAVENOUS at 08:50

## 2020-11-21 RX ADMIN — ALBUTEROL 1 PUFF(S): 90 AEROSOL, METERED ORAL at 08:04

## 2020-11-21 RX ADMIN — ALBUTEROL 1 PUFF(S): 90 AEROSOL, METERED ORAL at 13:14

## 2020-11-21 RX ADMIN — CHLORHEXIDINE GLUCONATE 1 APPLICATION(S): 213 SOLUTION TOPICAL at 06:19

## 2020-11-21 RX ADMIN — Medication 6.59 MICROGRAM(S)/KG/MIN: at 18:09

## 2020-11-21 RX ADMIN — CEFTRIAXONE 2000 MILLIGRAM(S): 500 INJECTION, POWDER, FOR SOLUTION INTRAMUSCULAR; INTRAVENOUS at 14:43

## 2020-11-21 RX ADMIN — MIDODRINE HYDROCHLORIDE 5 MILLIGRAM(S): 2.5 TABLET ORAL at 18:09

## 2020-11-21 RX ADMIN — ENOXAPARIN SODIUM 40 MILLIGRAM(S): 100 INJECTION SUBCUTANEOUS at 09:45

## 2020-11-21 RX ADMIN — Medication 250 MILLIGRAM(S): at 09:44

## 2020-11-21 RX ADMIN — ALBUTEROL 1 PUFF(S): 90 AEROSOL, METERED ORAL at 20:45

## 2020-11-21 RX ADMIN — MIDODRINE HYDROCHLORIDE 5 MILLIGRAM(S): 2.5 TABLET ORAL at 09:45

## 2020-11-21 NOTE — CONSULT NOTE ADULT - PROBLEM SELECTOR RECOMMENDATION 9
Minimal elevation is decreasing - likely related to septic shock; ECG has a non-ischemic appearance.

## 2020-11-21 NOTE — CONSULT NOTE ADULT - ASSESSMENT
49 y/o male pmhx asthma presented to ED 11/19 after being told to come by Urgent care for hyponatremia, on arrival to ED was found to be hypotensive. Patient admits to having some non specific symptoms of abdominal bloating, loss of appeitite and almost 20lb weight loss over past 3 weeks. Denies any abdominal pain. States 3 weeks ago, he started having malaise and non productive cough. Denies any SOB/ CP, abdominal pain, N/V, diarehha, fever/chills. In ED hypotensive despite 3L IVF, started on levophed, he was febrile as well. Admitted to ICU. Blood cultures growing streptococcus species, CT chest abd/pelvis unremarkable for source, denies recent dental work, eval by cardiology TTE shows moderate to severe AR, was given vanco/cefepime. Remains on pressors for hypotension.    1. sepsis with streptococcus species. mod-severe AR. ? endocarditis. hypotension  - ct chest abd/pelvis unremarkable  - TTE results reviewed  - cardiology eval noted, plan for RUKHSANA 11/23 r/o vegetation  - f/u final blood cx, repeat blood cx   - change cefepime to rocephin 2gm daily  - monitor temps  - tolerating abx well so far; no side effects noted  - reason for abx use and side effects reviewed with patient  - supportive care  - fu cbc    2. other issues - care per medicine

## 2020-11-21 NOTE — CONSULT NOTE ADULT - SUBJECTIVE AND OBJECTIVE BOX
CHIEF COMPLAINT: Admitted with hypotension, Sepsis but no complaints offered to me during this encounter    HPI:  49 y/o man with a history of asthma and lack of regular medical care presented to the ED on 12/19 for the evaluation of weakness, dyspnea, and abnormal labs (hyponatremia) after a recent visit to an Urgent Care Center.  He describes associated weight loss over the past 1 month.   He also describes recent malaise and decreased appetite.   He is unable to identify any exacerbating or alleviating factors.  He has no past history of heart disease.  He was found to be severely hypotensive and was admitted to the ICU for vasopressor treatment with a diagnosis of sepsis; blood cultures grew Gram + organism.  He describes a sedentary lifestyle; occasional dyspnea that he attributed to his known asthma.    PAST MEDICAL & SURGICAL HISTORY:  Asthma  No significant past surgical history    SOCIAL HISTORY:   Alcohol: Denied  Smoking: Nonsmoker    FAMILY HISTORY: No known family history of heart disease    MEDICATIONS  (STANDING):  ALBUTerol    90 MICROgram(s) HFA Inhaler 1 Puff(s) Inhalation every 6 hours  cefepime  Injectable. 1000 milliGRAM(s) IV Push every 8 hours  chlorhexidine 4% Liquid 1 Application(s) Topical <User Schedule>  enoxaparin Injectable 40 milliGRAM(s) SubCutaneous daily  midodrine 5 milliGRAM(s) Oral every 8 hours  norepinephrine Infusion 0.05 MICROgram(s)/kG/Min (6.59 mL/Hr) IV Continuous <Continuous>  vancomycin  IVPB 1000 milliGRAM(s) IV Intermittent once    Allergies:  No Known Allergies    REVIEW OF SYSTEMS:  CONSTITUTIONAL: + weakness  Eyes: No visual changes  NECK: No pain or stiffness  RESPIRATORY: No cough, wheezing, hemoptysis; + shortness of breath  CARDIOVASCULAR: No chest pain or palpitations  GASTROINTESTINAL: No abdominal pain.   GENITOURINARY: No dysuria, frequency or hematuria  NEUROLOGICAL: No numbness.  SKIN: No itching or rash  All other review of systems is negative unless indicated above    VITAL SIGNS:   T(C): 36.6 (21 Nov 2020 08:00), Max: 37.7 (20 Nov 2020 20:00)  T(F): 97.9 (21 Nov 2020 08:00), Max: 99.9 (20 Nov 2020 20:00)  HR: 96 (21 Nov 2020 08:48) (92 - 129)  BP: 95/55 (21 Nov 2020 08:00) (71/51 - 128/68)  BP(mean): 68 (21 Nov 2020 08:00) (56 - 107)  RR: 30 (21 Nov 2020 08:00) (20 - 37)  SpO2: 99% (21 Nov 2020 08:48) (94% - 100%)    PHYSICAL EXAM:  Constitutional: NAD, awake and alert, appears stated age   HEENT:  EOMI,  Pupils round, No oral cyanosis.  Pulmonary: Non-labored, breath sounds are clear bilaterally, No wheezing, rales or rhonchi  Cardiovascular: S1 and S2, regular rate and rhythm, + Murmur  Gastrointestinal: Bowel Sounds present, soft, nontender.   Lymph: No peripheral edema. No cervical lymphadenopathy.  Neurological: Alert, no focal deficits  Skin: No rashes.  Normal nail beds  Psych:  Mood & affect appropriate    LABS:                    8.7    8.16  )-----------( 310      ( 21 Nov 2020 07:05 )             27.5              136    |  104    |  5      ----------------------------<  102    3.6     |  26     |  0.47     PT/INR - ( 19 Nov 2020 22:12 )   PT: 16.4 sec;   INR: 1.44 ratio    PTT - ( 19 Nov 2020 22:12 )  PTT:33.8 sec    CARDIAC MARKERS ( 21 Nov 2020 07:05 ) 0.071 ng/mL / x     / x     / x     / x      CARDIAC MARKERS ( 20 Nov 2020 06:38 ) 0.124 ng/mL / x     / x     / x     / x      CARDIAC MARKERS ( 19 Nov 2020 22:12 ) 0.067 ng/mL / x     / x     / x     / x        12 Lead ECG (11.19.20 @ 22:01):  Sinus tachycardia, Possible Left atrial enlargement, Left anterior fascicular block.  No previous ECGs available    TTE Echo Complete w/o Contrast w/ Doppler (11.20.20 @ 10:19):   Mild (1+) mitral regurgitation is present.   Moderate to severe (3+) aortic regurgitation is present. A bicuspid aortic valve cannot be excluded.   Mild (1+) tricuspid valve regurgitation is present.   The left ventricle is normal in size, wall motion and contractility.  Mild concentric left ventricular hypertrophy is present. Estimated left ventricular ejection fraction is 60-65 %.   Mild dilatation of the ascending aortic root.    Tele: SR

## 2020-11-21 NOTE — CONSULT NOTE ADULT - SUBJECTIVE AND OBJECTIVE BOX
Patient is a 50y old  Male who presents with a chief complaint of Hypotension, Sepsis (20 Nov 2020 01:33)    HPI:  49 y/o male pmhx asthma presented to ED 11/19 after being told to come by Urgent care for hyponatremia, on arrival to ED was found to be hypotensive. Patient admits to having some non specific symptoms of abdominal bloating, loss of appeitite and almost 20lb weight loss over past 3 weeks. Denies any abdominal pain. States 3 weeks ago, he started having malaise and non productive cough. Denies any SOB/ CP, abdominal pain, N/V, diarehha, fever/chills. In ED hypotensive despite 3L IVF, started on levophed, he was febrile as well. Admitted to ICU. Blood cultures growing streptococcus species, CT chest abd/pelvis unremarkable for source, denies recent dental work, eval by cardiology TTE shows moderate to severe AR, was given vanco/cefepime. Remains on pressors for hypotension.      PMH: as above  PSH: as above  Meds: per reconciliation sheet, noted below  MEDICATIONS  (STANDING):  ALBUTerol    90 MICROgram(s) HFA Inhaler 1 Puff(s) Inhalation every 6 hours  cefTRIAXone Injectable. 2000 milliGRAM(s) IV Push every 24 hours  chlorhexidine 4% Liquid 1 Application(s) Topical <User Schedule>  enoxaparin Injectable 40 milliGRAM(s) SubCutaneous daily  midodrine 5 milliGRAM(s) Oral every 8 hours  norepinephrine Infusion 0.05 MICROgram(s)/kG/Min (6.59 mL/Hr) IV Continuous <Continuous>      Allergies    No Known Allergies    Intolerances      Social: no smoking, no alcohol, no illegal drugs; no recent travel, no exposure to TB  FAMILY HISTORY:     no history of premature cardiovascular disease in first degree relatives    ROS: the patient denies fever, no chills, no HA, no dizziness, no sore throat, no blurry vision, no CP, no palpitations, no SOB, no cough, no abdominal pain, no diarrhea, no N/V, no dysuria, no leg pain, no claudication, no rash, no joint aches, no rectal pain or bleeding, no night sweats  All other systems reviewed and are negative    Vital Signs Last 24 Hrs  T(C): 36.9 (21 Nov 2020 12:00), Max: 37.7 (20 Nov 2020 20:00)  T(F): 98.5 (21 Nov 2020 12:00), Max: 99.9 (20 Nov 2020 20:00)  HR: 106 (21 Nov 2020 13:00) (92 - 129)  BP: 103/49 (21 Nov 2020 13:00) (83/45 - 128/68)  BP(mean): 65 (21 Nov 2020 13:00) (57 - 107)  RR: 31 (21 Nov 2020 13:00) (22 - 37)  SpO2: 95% (21 Nov 2020 13:00) (94% - 100%)  Daily     Daily     PE:    Constitutional: NAD  HEENT: NC/AT, EOMI, PERRLA, conjunctivae clear; ears and nose atraumatic; pharynx benign  Neck: supple; thyroid not palpable  Back: no tenderness  Respiratory: respiratory effort normal; clear to auscultation  Cardiovascular: S1S2 regular, + murmur  Abdomen: soft, not tender, not distended, positive BS; liver and spleen WNL  Genitourinary: no suprapubic tenderness  Lymphatic: no LN palpable  Musculoskeletal: no muscle tenderness, no joint swelling or tenderness  Extremities: no pedal edema  Neurological/ Psychiatric: AxOx3, Judgement and insight normal;  moving all extremities  Skin: no rashes; no palpable lesions    Labs: all available labs reviewed                        8.7    8.16  )-----------( 310      ( 21 Nov 2020 07:05 )             27.5     11-21    136  |  104  |  5<L>  ----------------------------<  102<H>  3.6   |  26  |  0.47<L>    Ca    8.6      21 Nov 2020 07:05  Phos  3.4     11-21  Mg     1.8     11-21    TPro  7.0  /  Alb  2.2<L>  /  TBili  0.7  /  DBili  x   /  AST  38<H>  /  ALT  46  /  AlkPhos  36<L>  11-20     LIVER FUNCTIONS - ( 20 Nov 2020 06:38 )  Alb: 2.2 g/dL / Pro: 7.0 gm/dL / ALK PHOS: 36 U/L / ALT: 46 U/L / AST: 38 U/L / GGT: x                 Radiology: all available radiological tests reviewed  < from: CT Abdomen and Pelvis w/ IV Cont (11.20.20 @ 01:12) >  EXAM:  CT ABDOMEN AND PELVIS IC                          EXAM:  CTA CHEST PE PROTOCOL (W)AW IC                            PROCEDURE DATE:  11/20/2020          INTERPRETATION:  CT ANGIO CHEST PULMONARY EMBOLISM WITH IV CONTRAST  CT ABDOMEN AND PELVIS WITH IV CONTRAST    INDICATION: Tachycardia, hypotension and weakness    TECHNIQUE: Contrast enhanced CT imaging of the chest, abdomen and pelvis. Timing optimized for the pulmonary arteries. Sagittal and coronal reformat images are provided. Postprocessed MIP reformatted images were created and reviewed.    90 mL of Omnipaque 350 contrast material was injected IV.    COMPARISON: CT abdomen pelvis 10/5/2003..    FINDINGS:    Pulmonary arteries: No filling defects to suggest pulmonary emboli though detailed evaluation of the subsegmental branches limited secondary to suboptimal contrast bolus within these vessels and respiratory motion artifact.  Aorta: Atherosclerotic disease of the aorta and its branches.    Lines and tubes: None.  Airways:Patent central airways.  Lungs and Pleura: No consolidation, pleural effusion or pneumothorax. Mild bibasilar dependent atelectasis.  Lymph nodes: No mediastinal adenopathy. No hilar or axillary adenopathy.  Heart: Borderline cardiac silhouette. No pericardial effusion.    Liver: Enlarged fatty liver measuring 22 cm in length. Too small to characterize hepatic hypodensities.  Biliary: No dilatation. Partially contracted gallbladder.  Spleen: No suspicious lesions.  Pancreas: No inflammatory changes or ductal dilatation.  Adrenals: Normal.  Kidneys: No hydronephrosis. Too small to characterize bilateral renal hypodensities. Punctate nonobstructive stone in the inferior pole of left kidney.  Vessels: Normal aortic caliber. Atherosclerotic disease of the aorta and its branches.    GI tract: Small hiatal hernia or wall thickening of the distal thoracic esophagus for which nonemergent upper endoscopic evaluation is advised. No evidence of small bowel obstruction. No significant bowel wall thickening or inflammatory changes though detailed evaluation of GI tract is limited secondary to inadequate distention. Mild colonic diverticulosis without evidence of acute diverticular colitis. Normal appendix.    Peritoneum/retroperitoneum and mesentery: No free air. No organized fluid collection. No adenopathy.    Pelvic organs/Bladder: No prostatomegaly. Mild bladder wall thickening, difficult to evaluate secondary to inadequate distention.    Abdominal wall: A small fat containing umbilical andright groin hernia is noted.  Bones and soft tissues: Mild multilevel degenerative changes of the spine noted.    IMPRESSION:    Negative for pulmonary emboli though detailed evaluation of the subsegmental branches limited secondary to suboptimal contrast bolus within these vessels and respiratory motion artifact..    < end of copied text >    Advanced directives addressed: full resuscitation

## 2020-11-21 NOTE — PROGRESS NOTE ADULT - ASSESSMENT
A/P: 50 male who presented with hypotension and presumed to be from sepsis    Plan:  ICU    PULM: No active issues    Cardio: hypotension from septic shock, add midodrine, need to R/O Endocarditis, Moderate to severe AR on ECHO, RUKHSANA monday     Renal: D/C LR    GI: PO Diet, Will need an EGD for CT findings at some point    ID: Cultures with GPC , Continue Cefepime, give a dose Vanco and get an ID consult

## 2020-11-21 NOTE — CONSULT NOTE ADULT - PROBLEM SELECTOR RECOMMENDATION 3
Clinical presentation is worrisome for endocarditis (Ao V insuff, Strep bacteremia, weight loss, anemia, etc); I reviewed his TTE -- no clear vegetation identified; recommend RUKHSANA to better evaluate aortic leaflets.

## 2020-11-21 NOTE — PROGRESS NOTE ADULT - SUBJECTIVE AND OBJECTIVE BOX
HPI:  49 y/o male pmhx asthma presented to ED tonight after being told to come by Urgent care for hyponatremia, on arrival to ED was found to be hypotensive. Patient admits to having some non specific symptoms of abdominal bloating, loss of appeitite and almost 20lb weight loss over past 3 weeks. Denies any abdominal pain. States 3 weeks ago, he started having malaise and non productive cough. Denies any SOB/ CP, abdominal pain, N/V, diarehha, fever/chills. In ED hypotensive despite 3L IVF, started on levophed, he was febrile as well. Admitted to ICU.      11/20: PAtient seen in the ICU this morning.  On levophed Cultures are negative thus far.  Patient admits to a significant wt loss unintentionally   11/21: Patient still on and off pressors, [facility] in the BC    PAST MEDICAL & SURGICAL HISTORY:  Asthma    No significant past surgical history        FAMILY HISTORY:      Social Hx:    Allergies    No Known Allergies    Intolerances            ICU Vital Signs Last 24 Hrs  T(C): 36.6 (21 Nov 2020 08:00), Max: 37.7 (20 Nov 2020 20:00)  T(F): 97.9 (21 Nov 2020 08:00), Max: 99.9 (20 Nov 2020 20:00)  HR: 96 (21 Nov 2020 08:48) (92 - 129)  BP: 95/55 (21 Nov 2020 08:00) (71/51 - 128/68)  BP(mean): 68 (21 Nov 2020 08:00) (56 - 107)  ABP: --  ABP(mean): --  RR: 30 (21 Nov 2020 08:00) (20 - 37)  SpO2: 99% (21 Nov 2020 08:48) (94% - 100%)          I&O's Summary    20 Nov 2020 07:01  -  21 Nov 2020 07:00  --------------------------------------------------------  IN: 3686.3 mL / OUT: 2725 mL / NET: 961.3 mL                              8.7    8.16  )-----------( 310      ( 21 Nov 2020 07:05 )             27.5       11-21    136  |  104  |  5<L>  ----------------------------<  102<H>  3.6   |  26  |  0.47<L>    Ca    8.6      21 Nov 2020 07:05  Phos  3.4     11-21  Mg     1.8     11-21    TPro  7.0  /  Alb  2.2<L>  /  TBili  0.7  /  DBili  x   /  AST  38<H>  /  ALT  46  /  AlkPhos  36<L>  11-20      CARDIAC MARKERS ( 21 Nov 2020 07:05 )  0.071 ng/mL / x     / x     / x     / x      CARDIAC MARKERS ( 20 Nov 2020 06:38 )  0.124 ng/mL / x     / x     / x     / x      CARDIAC MARKERS ( 19 Nov 2020 22:12 )  0.067 ng/mL / x     / x     / x     / x                    MEDICATIONS  (STANDING):  ALBUTerol    90 MICROgram(s) HFA Inhaler 1 Puff(s) Inhalation every 6 hours  cefepime  Injectable. 1000 milliGRAM(s) IV Push every 8 hours  chlorhexidine 4% Liquid 1 Application(s) Topical <User Schedule>  enoxaparin Injectable 40 milliGRAM(s) SubCutaneous daily  midodrine 5 milliGRAM(s) Oral every 8 hours  norepinephrine Infusion 0.05 MICROgram(s)/kG/Min (6.59 mL/Hr) IV Continuous <Continuous>  vancomycin  IVPB 1000 milliGRAM(s) IV Intermittent once    MEDICATIONS  (PRN):      DVT Prophylaxis:    Advanced Directives:  Discussed with:    Visit Information: 30 min    ** Time is exclusive of billed procedures and/or teaching and/or routine family updates.

## 2020-11-22 LAB
-  CEFTRIAXONE: SIGNIFICANT CHANGE UP
-  CLINDAMYCIN: SIGNIFICANT CHANGE UP
-  ERYTHROMYCIN: SIGNIFICANT CHANGE UP
-  LEVOFLOXACIN: SIGNIFICANT CHANGE UP
-  PENICILLIN: SIGNIFICANT CHANGE UP
-  VANCOMYCIN: SIGNIFICANT CHANGE UP
ANION GAP SERPL CALC-SCNC: 5 MMOL/L — SIGNIFICANT CHANGE UP (ref 5–17)
BUN SERPL-MCNC: 6 MG/DL — LOW (ref 7–23)
CALCIUM SERPL-MCNC: 8.7 MG/DL — SIGNIFICANT CHANGE UP (ref 8.5–10.1)
CHLORIDE SERPL-SCNC: 104 MMOL/L — SIGNIFICANT CHANGE UP (ref 96–108)
CO2 SERPL-SCNC: 30 MMOL/L — SIGNIFICANT CHANGE UP (ref 22–31)
CREAT SERPL-MCNC: 0.51 MG/DL — SIGNIFICANT CHANGE UP (ref 0.5–1.3)
CULTURE RESULTS: SIGNIFICANT CHANGE UP
CULTURE RESULTS: SIGNIFICANT CHANGE UP
GLUCOSE SERPL-MCNC: 95 MG/DL — SIGNIFICANT CHANGE UP (ref 70–99)
HCT VFR BLD CALC: 30 % — LOW (ref 39–50)
HGB BLD-MCNC: 9.5 G/DL — LOW (ref 13–17)
MAGNESIUM SERPL-MCNC: 2.2 MG/DL — SIGNIFICANT CHANGE UP (ref 1.6–2.6)
MCHC RBC-ENTMCNC: 28.2 PG — SIGNIFICANT CHANGE UP (ref 27–34)
MCHC RBC-ENTMCNC: 31.7 GM/DL — LOW (ref 32–36)
MCV RBC AUTO: 89 FL — SIGNIFICANT CHANGE UP (ref 80–100)
METHOD TYPE: SIGNIFICANT CHANGE UP
METHOD TYPE: SIGNIFICANT CHANGE UP
ORGANISM # SPEC MICROSCOPIC CNT: SIGNIFICANT CHANGE UP
PHOSPHATE SERPL-MCNC: 3.5 MG/DL — SIGNIFICANT CHANGE UP (ref 2.5–4.5)
PLATELET # BLD AUTO: 320 K/UL — SIGNIFICANT CHANGE UP (ref 150–400)
POTASSIUM SERPL-MCNC: 4 MMOL/L — SIGNIFICANT CHANGE UP (ref 3.5–5.3)
POTASSIUM SERPL-SCNC: 4 MMOL/L — SIGNIFICANT CHANGE UP (ref 3.5–5.3)
RBC # BLD: 3.37 M/UL — LOW (ref 4.2–5.8)
RBC # FLD: 15.3 % — HIGH (ref 10.3–14.5)
SODIUM SERPL-SCNC: 139 MMOL/L — SIGNIFICANT CHANGE UP (ref 135–145)
SPECIMEN SOURCE: SIGNIFICANT CHANGE UP
SPECIMEN SOURCE: SIGNIFICANT CHANGE UP
WBC # BLD: 6.73 K/UL — SIGNIFICANT CHANGE UP (ref 3.8–10.5)
WBC # FLD AUTO: 6.73 K/UL — SIGNIFICANT CHANGE UP (ref 3.8–10.5)

## 2020-11-22 PROCEDURE — 99233 SBSQ HOSP IP/OBS HIGH 50: CPT

## 2020-11-22 RX ORDER — INFLUENZA VIRUS VACCINE 15; 15; 15; 15 UG/.5ML; UG/.5ML; UG/.5ML; UG/.5ML
0.5 SUSPENSION INTRAMUSCULAR ONCE
Refills: 0 | Status: COMPLETED | OUTPATIENT
Start: 2020-11-22 | End: 2020-11-22

## 2020-11-22 RX ADMIN — ALBUTEROL 1 PUFF(S): 90 AEROSOL, METERED ORAL at 20:39

## 2020-11-22 RX ADMIN — ENOXAPARIN SODIUM 40 MILLIGRAM(S): 100 INJECTION SUBCUTANEOUS at 09:57

## 2020-11-22 RX ADMIN — ALBUTEROL 1 PUFF(S): 90 AEROSOL, METERED ORAL at 07:56

## 2020-11-22 RX ADMIN — MIDODRINE HYDROCHLORIDE 5 MILLIGRAM(S): 2.5 TABLET ORAL at 01:16

## 2020-11-22 RX ADMIN — CHLORHEXIDINE GLUCONATE 1 APPLICATION(S): 213 SOLUTION TOPICAL at 05:43

## 2020-11-22 RX ADMIN — ALBUTEROL 1 PUFF(S): 90 AEROSOL, METERED ORAL at 13:47

## 2020-11-22 RX ADMIN — CEFTRIAXONE 2000 MILLIGRAM(S): 500 INJECTION, POWDER, FOR SOLUTION INTRAMUSCULAR; INTRAVENOUS at 14:12

## 2020-11-22 RX ADMIN — MIDODRINE HYDROCHLORIDE 5 MILLIGRAM(S): 2.5 TABLET ORAL at 09:57

## 2020-11-22 RX ADMIN — MIDODRINE HYDROCHLORIDE 5 MILLIGRAM(S): 2.5 TABLET ORAL at 17:45

## 2020-11-22 NOTE — PROGRESS NOTE ADULT - SUBJECTIVE AND OBJECTIVE BOX
REASON FOR VISIT: Aortic valve insuff    HPI:  51 y/o man with a history of asthma and lack of regular medical care admitted on 11/20/20 with Streptococcal septic shock; found to have moderate-severe aortic insufficiency; no history of murmur/valve disorder.    11/22/20:  "I feel much better."  No cardiac complaints - denies dyspnea.    MEDICATIONS  (STANDING):  ALBUTerol    90 MICROgram(s) HFA Inhaler 1 Puff(s) Inhalation every 6 hours  cefTRIAXone Injectable. 2000 milliGRAM(s) IV Push every 24 hours  chlorhexidine 4% Liquid 1 Application(s) Topical <User Schedule>  enoxaparin Injectable 40 milliGRAM(s) SubCutaneous daily  influenza   Vaccine 0.5 milliLiter(s) IntraMuscular once  midodrine 5 milliGRAM(s) Oral every 8 hours  norepinephrine Infusion 0.05 MICROgram(s)/kG/Min (6.59 mL/Hr) IV Continuous <Continuous>    Vital Signs Last 24 Hrs  T(C): 36.7 (22 Nov 2020 06:00), Max: 36.9 (21 Nov 2020 12:00)  T(F): 98.1 (22 Nov 2020 06:00), Max: 98.5 (21 Nov 2020 12:00)  HR: 101 (22 Nov 2020 08:00) (95 - 116)  BP: 102/56 (22 Nov 2020 08:00) (81/46 - 106/56)  BP(mean): 70 (22 Nov 2020 08:00) (57 - 76)  RR: 29 (22 Nov 2020 08:00) (22 - 31)  SpO2: 97% (22 Nov 2020 08:00) (93% - 100%)    PHYSICAL EXAM:  Constitutional: NAD, awake and alert, semirecumbent in bed   HEENT:  No oral cyanosis.  Pulmonary: Non-labored, breath sounds are clear bilaterally  Cardiovascular: S1 and S2, regular rate and rhythm, + diastolic murmur  Gastrointestinal: Bowel Sounds present, soft, nontender.   Lymph: No edema.  Skin: No rashes.  Normal nail beds.  Tattoo    LABS:         CARDIAC MARKERS ( 21 Nov 2020 07:05 ) 0.071 ng/mL / x     / x     / x     / x                     9.5    6.73  )-----------( 320      ( 22 Nov 2020 06:35 )             30.0     139  |  104  |  6<L>  ----------------------------<  95  4.0   |  30  |  0.51    12 Lead ECG (11.19.20 @ 22:01):  Sinus tachycardia, Possible Left atrial enlargement, Left anterior fascicular block.  No previous ECGs available    TTE Echo Complete w/o Contrast w/ Doppler (11.20.20 @ 10:19):   Mild (1+) mitral regurgitation is present.   Moderate to severe (3+) aortic regurgitation is present. A bicuspid aortic valve cannot be excluded.   Mild (1+) tricuspid valve regurgitation is present.   The left ventricle is normal in size, wall motion and contractility.  Mild concentric left ventricular hypertrophy is present. Estimated left ventricular ejection fraction is 60-65 %.   Mild dilatation of the ascending aortic root.    Tele: SR

## 2020-11-22 NOTE — PROGRESS NOTE ADULT - SUBJECTIVE AND OBJECTIVE BOX
HPI:  51 y/o male pmhx asthma presented to ED tonight after being told to come by Urgent care for hyponatremia, on arrival to ED was found to be hypotensive. Patient admits to having some non specific symptoms of abdominal bloating, loss of appeitite and almost 20lb weight loss over past 3 weeks. Denies any abdominal pain. States 3 weeks ago, he started having malaise and non productive cough. Denies any SOB/ CP, abdominal pain, N/V, diarehha, fever/chills. In ED hypotensive despite 3L IVF, started on levophed, he was febrile as well. Admitted to ICU.      11/20: PAtient seen in the ICU this morning.  On levophed Cultures are negative thus far.  Patient admits to a significant wt loss unintentionally   11/21: Patient still on and off pressors, [facility]PC in the BC  11/22: Off Pressors, for RUKHSANA in AM      PAST MEDICAL & SURGICAL HISTORY:  Asthma    No significant past surgical history        FAMILY HISTORY:      Social Hx:    Allergies    No Known Allergies    Intolerances            ICU Vital Signs Last 24 Hrs  T(C): 36.7 (22 Nov 2020 06:00), Max: 36.9 (21 Nov 2020 12:00)  T(F): 98.1 (22 Nov 2020 06:00), Max: 98.5 (21 Nov 2020 12:00)  HR: 101 (22 Nov 2020 08:00) (95 - 116)  BP: 102/56 (22 Nov 2020 08:00) (81/46 - 106/56)  BP(mean): 70 (22 Nov 2020 08:00) (57 - 76)  ABP: --  ABP(mean): --  RR: 29 (22 Nov 2020 08:00) (22 - 31)  SpO2: 97% (22 Nov 2020 08:00) (93% - 99%)          I&O's Summary    21 Nov 2020 07:01  -  22 Nov 2020 07:00  --------------------------------------------------------  IN: 704.9 mL / OUT: 1650 mL / NET: -945.1 mL                              9.5    6.73  )-----------( 320      ( 22 Nov 2020 06:35 )             30.0       11-22    139  |  104  |  6<L>  ----------------------------<  95  4.0   |  30  |  0.51    Ca    8.7      22 Nov 2020 06:35  Phos  3.5     11-22  Mg     2.2     11-22        CARDIAC MARKERS ( 21 Nov 2020 07:05 )  0.071 ng/mL / x     / x     / x     / x                    MEDICATIONS  (STANDING):  ALBUTerol    90 MICROgram(s) HFA Inhaler 1 Puff(s) Inhalation every 6 hours  cefTRIAXone Injectable. 2000 milliGRAM(s) IV Push every 24 hours  chlorhexidine 4% Liquid 1 Application(s) Topical <User Schedule>  enoxaparin Injectable 40 milliGRAM(s) SubCutaneous daily  influenza   Vaccine 0.5 milliLiter(s) IntraMuscular once  midodrine 5 milliGRAM(s) Oral every 8 hours  norepinephrine Infusion 0.05 MICROgram(s)/kG/Min (6.59 mL/Hr) IV Continuous <Continuous>    MEDICATIONS  (PRN):      DVT Prophylaxis:    Advanced Directives:  Discussed with:    Visit Information:    ** Time is exclusive of billed procedures and/or teaching and/or routine family updates.

## 2020-11-22 NOTE — PROGRESS NOTE ADULT - ASSESSMENT
A/P: 50 male who presented with hypotension and presumed to be from sepsis    Plan:  ICU    PULM: No active issues    Cardio: Hypotension from septic shock, added midodrine, need to R/O Endocarditis, Moderate to severe AR on ECHO, RUKHSANA monday     Renal: stable    GI: PO Diet, Will need an EGD for CT findings at some point    ID: Cultures with GPC , Rocephin, RUKHSANA to R/O endocarditis, ID consult noted     SQH DVT prophylaxis

## 2020-11-22 NOTE — PROGRESS NOTE ADULT - PROBLEM SELECTOR PLAN 2
No past history of murmur or valve disease; RUKHSANA this week to evaluate aortic valve leaflets (? vegetation, ? morphology).  I discussed RUKHSANA indications, benefits, risks, nature of procedure.

## 2020-11-23 ENCOUNTER — TRANSCRIPTION ENCOUNTER (OUTPATIENT)
Age: 50
End: 2020-11-23

## 2020-11-23 VITALS
SYSTOLIC BLOOD PRESSURE: 113 MMHG | HEART RATE: 120 BPM | OXYGEN SATURATION: 97 % | RESPIRATION RATE: 27 BRPM | DIASTOLIC BLOOD PRESSURE: 56 MMHG

## 2020-11-23 LAB
ANION GAP SERPL CALC-SCNC: 6 MMOL/L — SIGNIFICANT CHANGE UP (ref 5–17)
BUN SERPL-MCNC: 5 MG/DL — LOW (ref 7–23)
CALCIUM SERPL-MCNC: 9.1 MG/DL — SIGNIFICANT CHANGE UP (ref 8.5–10.1)
CHLORIDE SERPL-SCNC: 106 MMOL/L — SIGNIFICANT CHANGE UP (ref 96–108)
CO2 SERPL-SCNC: 26 MMOL/L — SIGNIFICANT CHANGE UP (ref 22–31)
CREAT SERPL-MCNC: 0.36 MG/DL — LOW (ref 0.5–1.3)
GLUCOSE SERPL-MCNC: 102 MG/DL — HIGH (ref 70–99)
HCT VFR BLD CALC: 30.3 % — LOW (ref 39–50)
HGB BLD-MCNC: 9.3 G/DL — LOW (ref 13–17)
MAGNESIUM SERPL-MCNC: 2 MG/DL — SIGNIFICANT CHANGE UP (ref 1.6–2.6)
MCHC RBC-ENTMCNC: 27.3 PG — SIGNIFICANT CHANGE UP (ref 27–34)
MCHC RBC-ENTMCNC: 30.7 GM/DL — LOW (ref 32–36)
MCV RBC AUTO: 88.9 FL — SIGNIFICANT CHANGE UP (ref 80–100)
PHOSPHATE SERPL-MCNC: 3.6 MG/DL — SIGNIFICANT CHANGE UP (ref 2.5–4.5)
PLATELET # BLD AUTO: 348 K/UL — SIGNIFICANT CHANGE UP (ref 150–400)
POTASSIUM SERPL-MCNC: 3.8 MMOL/L — SIGNIFICANT CHANGE UP (ref 3.5–5.3)
POTASSIUM SERPL-SCNC: 3.8 MMOL/L — SIGNIFICANT CHANGE UP (ref 3.5–5.3)
RBC # BLD: 3.41 M/UL — LOW (ref 4.2–5.8)
RBC # FLD: 15.2 % — HIGH (ref 10.3–14.5)
SODIUM SERPL-SCNC: 138 MMOL/L — SIGNIFICANT CHANGE UP (ref 135–145)
WBC # BLD: 8.01 K/UL — SIGNIFICANT CHANGE UP (ref 3.8–10.5)
WBC # FLD AUTO: 8.01 K/UL — SIGNIFICANT CHANGE UP (ref 3.8–10.5)

## 2020-11-23 PROCEDURE — 99233 SBSQ HOSP IP/OBS HIGH 50: CPT | Mod: 25

## 2020-11-23 PROCEDURE — 93325 DOPPLER ECHO COLOR FLOW MAPG: CPT | Mod: 26

## 2020-11-23 PROCEDURE — 99233 SBSQ HOSP IP/OBS HIGH 50: CPT

## 2020-11-23 PROCEDURE — 93312 ECHO TRANSESOPHAGEAL: CPT | Mod: 26

## 2020-11-23 PROCEDURE — 93320 DOPPLER ECHO COMPLETE: CPT | Mod: 26

## 2020-11-23 RX ORDER — ALBUTEROL 90 UG/1
1 AEROSOL, METERED ORAL
Qty: 0 | Refills: 0 | DISCHARGE
Start: 2020-11-23

## 2020-11-23 RX ORDER — MIDODRINE HYDROCHLORIDE 2.5 MG/1
1 TABLET ORAL
Qty: 0 | Refills: 0 | DISCHARGE
Start: 2020-11-23

## 2020-11-23 RX ORDER — CEFTRIAXONE 500 MG/1
2 INJECTION, POWDER, FOR SOLUTION INTRAMUSCULAR; INTRAVENOUS
Qty: 0 | Refills: 0 | DISCHARGE
Start: 2020-11-23

## 2020-11-23 RX ADMIN — ENOXAPARIN SODIUM 40 MILLIGRAM(S): 100 INJECTION SUBCUTANEOUS at 10:16

## 2020-11-23 RX ADMIN — MIDODRINE HYDROCHLORIDE 5 MILLIGRAM(S): 2.5 TABLET ORAL at 10:16

## 2020-11-23 RX ADMIN — MIDODRINE HYDROCHLORIDE 5 MILLIGRAM(S): 2.5 TABLET ORAL at 17:13

## 2020-11-23 RX ADMIN — MIDODRINE HYDROCHLORIDE 5 MILLIGRAM(S): 2.5 TABLET ORAL at 01:48

## 2020-11-23 RX ADMIN — CEFTRIAXONE 2000 MILLIGRAM(S): 500 INJECTION, POWDER, FOR SOLUTION INTRAMUSCULAR; INTRAVENOUS at 14:41

## 2020-11-23 NOTE — DISCHARGE NOTE PROVIDER - NSDCCPCAREPLAN_GEN_ALL_CORE_FT
PRINCIPAL DISCHARGE DIAGNOSIS  Diagnosis: Hypotension  Assessment and Plan of Treatment:       SECONDARY DISCHARGE DIAGNOSES  Diagnosis: Elevated troponin  Assessment and Plan of Treatment:     Diagnosis: Sepsis  Assessment and Plan of Treatment:

## 2020-11-23 NOTE — PROGRESS NOTE ADULT - PROBLEM SELECTOR PLAN 2
No past history of murmur or valve disease; moderate to severe AR on echo. RUKHSANA today to evaluate aortic valve for possible vegetation & to evaluate leaflet morphology.

## 2020-11-23 NOTE — PROGRESS NOTE ADULT - RS GEN PE MLT RESP DETAILS PC
breath sounds equal
no rales/no wheezes/breath sounds equal/no rhonchi
breath sounds equal/no rales/no wheezes/no rhonchi

## 2020-11-23 NOTE — PROGRESS NOTE ADULT - ASSESSMENT
A/P: 50 male who presented with hypotension and presumed to be from sepsis  R/O Endocarditis     Plan:  ICU    PULM: No active issues    Cardio: Hypotension from septic shock, added midodrine, need to R/O Endocarditis, Moderate to severe AR on ECHO, RUKHSANA today     Renal: stable    GI: PO Diet, Will need an EGD for CT findings at some point    ID: Cultures with Strep gordonii , Rocephin, RUKHSANA to R/O endocarditis, ID consult noted     SQH DVT prophylaxis     A/P: 50 male who presented with hypotension and presumed to be from sepsis  R/O Endocarditis     Plan:  ICU    PULM: No active issues    Cardio: Hypotension from septic shock, added midodrine, need to R/O Endocarditis, Moderate to severe AR on ECHO, RUKHSANA today  Elevated trop not related to ischemia and likely from the the cardiac abscess.       Renal: stable    GI: PO Diet, Will need an EGD for CT findings at some point    ID: Cultures with Strep gordonii , Rocephin, RUKHSANA to R/O endocarditis, ID consult noted     H DVT prophylaxis

## 2020-11-23 NOTE — PACU DISCHARGE NOTE - COMMENTS
Patient is awake and alert. Speech clear. Patient spoke with Dr. Real regarding findings of RUKHSANA and further plan of care. Report to ICU RN Ariella. Patient transported mera to ICU via stretcher accompanied by RN and cardiac transport monitor in place.

## 2020-11-23 NOTE — PROGRESS NOTE ADULT - ASSESSMENT
49 y/o male pmhx asthma presented to ED 11/19 after being told to come by Urgent care for hyponatremia, on arrival to ED was found to be hypotensive. Patient admits to having some non specific symptoms of abdominal bloating, loss of appeitite and almost 20lb weight loss over past 3 weeks. Denies any abdominal pain. States 3 weeks ago, he started having malaise and non productive cough. Denies any SOB/ CP, abdominal pain, N/V, diarehha, fever/chills. In ED hypotensive despite 3L IVF, started on levophed, he was febrile as well. Admitted to ICU. Blood cultures growing streptococcus species, CT chest abd/pelvis unremarkable for source, did have dental work in the summer months for dental pain, placement of cap on lower molar;  eval by cardiology TTE shows moderate to severe AR, was given vanco/cefepime. Remains on pressors for hypotension.    1. sepsis with streptococcus species. mod-severe AR. ? endocarditis. hypotension  - RUKHSANA remarkable for mitral valve abscess and perforation; aortic root abscess; also noted with bicuspid aortic valve  - ct chest abd/pelvis unremarkable  - blood cultures significant with Strep gordonii; IMTIAZ sensitivity good for ceftriaxone; patient is on ceftriaxone 2 grams daily  - for transfer to [facility] ICU  - monitor temps  - tolerating abx well so far; no side effects noted  - reason for abx use and side effects reviewed with patient  - supportive care  - fu cbc    2. other issues - care per medicine

## 2020-11-23 NOTE — PROGRESS NOTE ADULT - SUBJECTIVE AND OBJECTIVE BOX
HPI:  49 y/o male pmhx asthma presented to ED tonight after being told to come by Urgent care for hyponatremia, on arrival to ED was found to be hypotensive. Patient admits to having some non specific symptoms of abdominal bloating, loss of appeitite and almost 20lb weight loss over past 3 weeks. Denies any abdominal pain. States 3 weeks ago, he started having malaise and non productive cough. Denies any SOB/ CP, abdominal pain, N/V, diarehha, fever/chills. In ED hypotensive despite 3L IVF, started on levophed, he was febrile as well. Admitted to ICU.      11/20: Patient seen in the ICU this morning.  On levophed Cultures are negative thus far.  Patient admits to a significant wt loss unintentionally   11/21: Patient still on and off pressors, GPC in the BC  11/22: Off Pressors, for RUKHSANA in AM  11/23: For RUKHSANA today, no events over night      PAST MEDICAL & SURGICAL HISTORY:  Asthma    No significant past surgical history        FAMILY HISTORY:      Social Hx:    Allergies    No Known Allergies    Intolerances            ICU Vital Signs Last 24 Hrs  T(C): 36.2 (23 Nov 2020 06:00), Max: 36.9 (22 Nov 2020 20:00)  T(F): 97.1 (23 Nov 2020 06:00), Max: 98.5 (22 Nov 2020 20:00)  HR: 102 (23 Nov 2020 07:00) (89 - 130)  BP: 100/55 (23 Nov 2020 06:00) (83/56 - 110/64)  BP(mean): 69 (23 Nov 2020 06:00) (59 - 80)  ABP: --  ABP(mean): --  RR: 24 (23 Nov 2020 07:00) (14 - 29)  SpO2: 97% (23 Nov 2020 07:00) (94% - 98%)          I&O's Summary    22 Nov 2020 07:01  -  23 Nov 2020 07:00  --------------------------------------------------------  IN: 0 mL / OUT: 1150 mL / NET: -1150 mL                              9.3    8.01  )-----------( 348      ( 23 Nov 2020 06:20 )             30.3       11-23    138  |  106  |  5<L>  ----------------------------<  102<H>  3.8   |  26  |  0.36<L>    Ca    9.1      23 Nov 2020 06:20  Phos  3.6     11-23  Mg     2.0     11-23      MEDICATIONS  (STANDING):  ALBUTerol    90 MICROgram(s) HFA Inhaler 1 Puff(s) Inhalation every 6 hours  cefTRIAXone Injectable. 2000 milliGRAM(s) IV Push every 24 hours  chlorhexidine 4% Liquid 1 Application(s) Topical <User Schedule>  enoxaparin Injectable 40 milliGRAM(s) SubCutaneous daily  influenza   Vaccine 0.5 milliLiter(s) IntraMuscular once  midodrine 5 milliGRAM(s) Oral every 8 hours  norepinephrine Infusion 0.05 MICROgram(s)/kG/Min (6.59 mL/Hr) IV Continuous <Continuous>    MEDICATIONS  (PRN):      DVT Prophylaxis: Lovenox    Advanced Directives:  Discussed with:    Visit Information:    ** Time is exclusive of billed procedures and/or teaching and/or routine family updates.

## 2020-11-23 NOTE — DISCHARGE NOTE PROVIDER - NSDCMRMEDTOKEN_GEN_ALL_CORE_FT
albuterol 90 mcg/inh inhalation aerosol: 1 puff(s) inhaled every 6 hours  cefTRIAXone 2 g intravenous injection: 2 gram(s) intravenous every 24 hours  midodrine 5 mg oral tablet: 1 tab(s) orally every 8 hours

## 2020-11-23 NOTE — DISCHARGE NOTE PROVIDER - HOSPITAL COURSE
49 y/o male pmhx asthma presented to ED after being told to come by Urgent care for hyponatremia, on arrival to ED was found to be hypotensive. Patient admits to having some non specific symptoms of abdominal bloating, loss of appeitite and almost 20lb weight loss over past 3 weeks. Patient was started on pressors and admitted to the ICU.  Blood Cultures revealed Strep gordanii.  RUKHSANA showed  two complex abscesses on the atrial side of the mitral valve: one at the junction of the aortic root & the anterior leaflet of the mitral valve and a second abscess in the middle of the atrial aspect of the anterior mitral valve leaflet. There is perforation of the mitral   valve leaflet. Each abscess is approximately 1.5 x 1.5 cm in dimension. Mild eccentric mitral regurgitation is present.    Arrangements were made to transfer to patient to [facility] in [facility] emergently

## 2020-11-23 NOTE — PROGRESS NOTE ADULT - SUBJECTIVE AND OBJECTIVE BOX
Completed RUKHSANA study:   --------------------  Summary     There are two complex abscesses on the atrial side of the mitral valve:   one at the junction of the aortic root & the anterior leaflet of the   mitral valve and a second abscess in the middle of the the atrial aspect   of the anterior mitral valve leaflet. There is perforation of the mitral   valve leaflet.   Each abscess is approximately 1.5 x 1.5 cm in dimension.   Mild eccentric mitral regurgitation is present.     Moderate to severe aortic regurgitation is present.     A bicuspid aortic valve is present with mild thickening at the tips of the   cusp.    Estimated left ventricular ejection fraction is 60-65 %.  --------------------  Discussed w/ family need for urgent surgery.  Family asked me to speak w/ Dr. James, CT surgery at [place].  He reviewed images I sent to him by phone.  He agreed pt needs surgery but given complexity of case   due to presence of abscess at the junction of aortic root & mitral leaflet,  pt should be transferred to [facility] in [place] w/  accepting surgeon being Dr. Cal Espinosa.    Discussed w/ ID & ICU team.  Discussed w/ family & patient.  Plan for transfer today.

## 2020-11-23 NOTE — PROGRESS NOTE ADULT - SUBJECTIVE AND OBJECTIVE BOX
51 y/o man with a history of asthma and lack of regular medical care admitted on 11/20/20 with Streptococcal septic shock; found to have moderate-severe aortic insufficiency; no history of murmur/valve disorder.    11/22/20:  "I feel much better."  No cardiac complaints - denies dyspnea.  11/23/20: no complaints discussed RUKHSANA procedure planned for today.      MEDICATIONS:  OUTPATIENT  Home Medications:      INPATIENT  MEDICATIONS  (STANDING):  ALBUTerol    90 MICROgram(s) HFA Inhaler 1 Puff(s) Inhalation every 6 hours  cefTRIAXone Injectable. 2000 milliGRAM(s) IV Push every 24 hours  chlorhexidine 4% Liquid 1 Application(s) Topical <User Schedule>  enoxaparin Injectable 40 milliGRAM(s) SubCutaneous daily  influenza   Vaccine 0.5 milliLiter(s) IntraMuscular once  midodrine 5 milliGRAM(s) Oral every 8 hours    MEDICATIONS  (PRN):    Vital Signs Last 24 Hrs  T(C): 36.2 (23 Nov 2020 06:00), Max: 36.9 (22 Nov 2020 20:00)  T(F): 97.1 (23 Nov 2020 06:00), Max: 98.5 (22 Nov 2020 20:00)  HR: 110 (23 Nov 2020 10:00) (89 - 130)  BP: 108/66 (23 Nov 2020 10:00) (83/56 - 117/60)  BP(mean): 79 (23 Nov 2020 10:00) (59 - 80)  RR: 24 (23 Nov 2020 10:00) (19 - 29)  SpO2: 98% (23 Nov 2020 10:00) (94% - 98%)Daily     Daily I&O's Summary      PHYSICAL EXAM:    Constitutional: NAD, awake and alert, semirecumbent in bed   HEENT:  No oral cyanosis.  Pulmonary: Non-labored, breath sounds are clear bilaterally  Cardiovascular: S1 and S2, regular rate and rhythm, + diastolic murmur  Gastrointestinal: Bowel Sounds present, soft, nontender.   Lymph: No edema.  Skin: No rashes.  Normal nail beds.  Tattoo      LABS: All Labs Reviewed:                        9.3    8.01  )-----------( 348      ( 23 Nov 2020 06:20 )             30.3                         9.5    6.73  )-----------( 320      ( 22 Nov 2020 06:35 )             30.0                         8.7    8.16  )-----------( 310      ( 21 Nov 2020 07:05 )             27.5     23 Nov 2020 06:20    138    |  106    |  5      ----------------------------<  102    3.8     |  26     |  0.36   22 Nov 2020 06:35    139    |  104    |  6      ----------------------------<  95     4.0     |  30     |  0.51   21 Nov 2020 07:05    136    |  104    |  5      ----------------------------<  102    3.6     |  26     |  0.47     Ca    9.1        23 Nov 2020 06:20  Ca    8.7        22 Nov 2020 06:35  Ca    8.6        21 Nov 2020 07:05  Phos  3.6       23 Nov 2020 06:20  Phos  3.5       22 Nov 2020 06:35  Phos  3.4       21 Nov 2020 07:05  Mg     2.0       23 Nov 2020 06:20  Mg     2.2       22 Nov 2020 06:35  Mg     1.8       21 Nov 2020 07:05            Blood Culture: Organism --  Gram Stain Blood -- Gram Stain --  Specimen Source .Blood None  Culture-Blood --    Organism --  Gram Stain Blood -- Gram Stain --  Specimen Source .Blood None  Culture-Blood --    Organism Streptococcus gordonii  Gram Stain Blood -- Gram Stain   Growth in aerobic bottle: Gram Positive Cocci in Pairs and Chains  Growth in anaerobic bottle: Gram Positive Cocci in Pairs and Chains  Specimen Source .Blood Blood-Peripheral  Culture-Blood --    Organism --  Gram Stain Blood -- Gram Stain --  Specimen Source .Urine Clean Catch (Midstream)  Culture-Blood --      12 Lead ECG (11.19.20 @ 22:01):  Sinus tachycardia, Possible Left atrial enlargement, Left anterior fascicular block.  No previous ECGs available    TTE Echo Complete w/o Contrast w/ Doppler (11.20.20 @ 10:19):   Mild (1+) mitral regurgitation is present.   Moderate to severe (3+) aortic regurgitation is present. A bicuspid aortic valve cannot be excluded.   Mild (1+) tricuspid valve regurgitation is present.   The left ventricle is normal in size, wall motion and contractility.  Mild concentric left ventricular hypertrophy is present. Estimated left ventricular ejection fraction is 60-65 %.   Mild dilatation of the ascending aortic root.    Tele: SR

## 2020-11-23 NOTE — PROGRESS NOTE ADULT - SUBJECTIVE AND OBJECTIVE BOX
Date of service: 11-23-20 @ 14:25      Patient recounts a cap being placed on a lower molar in the summer months when he had a bad toothache; he did not take any pre procedure antibiotics at that time  Had RUKHSANA done earlier today found to have large abscess on mitral valve extending into aortic root, with perforation of the mitral valve        ROS: no fever or chills; denies dizziness, no HA, no SOB or cough, no abdominal pain, no diarrhea or constipation; no dysuria, no urinary frequency, no legs pain, no rashes    MEDICATIONS  (STANDING):  ALBUTerol    90 MICROgram(s) HFA Inhaler 1 Puff(s) Inhalation every 6 hours  cefTRIAXone Injectable. 2000 milliGRAM(s) IV Push every 24 hours  chlorhexidine 4% Liquid 1 Application(s) Topical <User Schedule>  enoxaparin Injectable 40 milliGRAM(s) SubCutaneous daily  influenza   Vaccine 0.5 milliLiter(s) IntraMuscular once  midodrine 5 milliGRAM(s) Oral every 8 hours    MEDICATIONS  (PRN):      Vital Signs Last 24 Hrs  T(C): 36.4 (23 Nov 2020 12:00), Max: 36.9 (22 Nov 2020 20:00)  T(F): 97.5 (23 Nov 2020 12:00), Max: 98.5 (22 Nov 2020 20:00)  HR: 111 (23 Nov 2020 14:00) (89 - 130)  BP: 112/60 (23 Nov 2020 14:00) (83/56 - 117/60)  BP(mean): 76 (23 Nov 2020 14:00) (59 - 80)  RR: 30 (23 Nov 2020 14:00) (14 - 30)  SpO2: 98% (23 Nov 2020 14:00) (94% - 100%)        Physical Exam:          Constitutional: NAD  HEENT: NC/AT, EOMI, PERRLA, conjunctivae clear; ears and nose atraumatic; pharynx benign; cap on left lower molar tooth; no subconjunctival hemorrhages  Neck: supple; thyroid not palpable  Back: no tenderness  Respiratory: respiratory effort normal; clear to auscultation  Cardiovascular: S1S2 regular, + murmur  Abdomen: soft, not tender, not distended, positive BS; liver and spleen WNL  Genitourinary: no suprapubic tenderness  Musculoskeletal: no muscle tenderness, no joint swelling or tenderness  Extremities: no pedal edema  Neurological/ Psychiatric: AxOx3, Judgement and insight normal;  moving all extremities  Skin: no rashes; no palpable lesions    Labs: all available labs reviewed                         Labs:                        9.3    8.01  )-----------( 348      ( 23 Nov 2020 06:20 )             30.3     11-23    138  |  106  |  5<L>  ----------------------------<  102<H>  3.8   |  26  |  0.36<L>    Ca    9.1      23 Nov 2020 06:20  Phos  3.6     11-23  Mg     2.0     11-23             Cultures:       Culture - Blood (collected 11-21-20 @ 15:12)  Source: .Blood None  Preliminary Report (11-22-20 @ 22:01):    No growth to date.    Culture - Blood (collected 11-21-20 @ 15:07)  Source: .Blood None  Preliminary Report (11-22-20 @ 22:01):    No growth to date.    Culture - Blood (collected 11-19-20 @ 22:12)  Source: .Blood Blood-Peripheral  Gram Stain (11-21-20 @ 00:36):    Growth in aerobic bottle: Gram Positive Cocci in Pairs and Chains    Growth in anaerobic bottle: Gram Positive Cocci in Pairs and Chains  Final Report (11-22-20 @ 16:43):    Growth in aerobic and anaerobic bottles: Streptococcus gordonii See    previous culture [id number]    ***Blood Panel PCR results on this specimen are available    approximately 3 hours after the Gram stain result.***    Gram stain, PCR, and/or cultureresults may not always    correspond due to difference in methodologies.    ************************************************************    This PCR assay was performed using [employer].    The following targets are tested for: Enterococcus,    vancomycin resistant enterococci, Listeria monocytogenes,    coagulase negative staphylococci, S. aureus,    methicillin resistant S. aureus, Streptococcus agalactiae    (Group B), S. pneumoniae, S. pyogenes (Group A),    Acinetobacter baumannii, Enterobacter cloacae, E. coli,    Klebsiella oxytoca, K. pneumoniae, Proteus sp.,    Serratia marcescens, Haemophilus influenzae,    Neisseria meningitidis, Pseudomonas aeruginosa, Candida    albicans, C. glabrata, C krusei, C parapsilosis,    C. tropicalis and the KPC resistance gene.    "Due to technical problems, Proteus sp. will Not be reported as part of    the BCID panel until further notice"  Organism: Blood Culture PCR (11-22-20 @ 16:43)  Organism: Blood Culture PCR (11-22-20 @ 16:43)      -  Streptococcus sp. (Not Grp A, B or S pneumoniae): Detec      Method Type: PCR    Culture - Blood (collected 11-19-20 @ 22:12)  Source: .Blood Blood-Peripheral  Gram Stain (11-21-20 @ 03:45):    Growth in aerobic bottle: Gram Positive Cocci in Pairs and Chains    Growth in anaerobic bottle: Gram Positive Cocci in Pairs and Chains  Final Report (11-22-20 @ 16:19):    Growth in aerobic and anaerobic bottles: Streptococcus gordonii  Organism: Streptococcus gordonii  Streptococcus gordonii (11-22-20 @ 16:19)  Organism: Streptococcus gordonii (11-22-20 @ 16:19)      -  Ceftriaxone: S 0.032      -  Penicillin: S 0.023      Method Type: ETEST  Organism: Streptococcus gordonii (11-22-20 @ 16:19)      -  Clindamycin: S      -  Erythromycin: S      -  Levofloxacin: S      -  Vancomycin: S      Method Type: KB    Culture - Urine (collected 11-19-20 @ 01:27)  Source: .Urine Clean Catch (Midstream)  Final Report (11-21-20 @ 16:48):    <10,000 CFU/mL Normal Urogenital Veronica                < from: Transesophageal Echocardiogram (11.23.20 @ 11:57) >     Impression     Summary     There are two complex abscesses on the atrial side of the mitral valve:   one at the junction of the aortic root & the anterior leaflet of the   mitral valve and a second abscess in the middle of the the atrial aspect   of the anterior mitral valve leaflet.There is perforation of the mitral   valve leaflet.   Each abscess is approximately 1.5 x 1.5 cm in dimension.   Mild eccentric mitral regurgitation is present.     Moderate to severe aortic regurgitation is present.     A bicuspid aortic valve is present with mild thickening at the tips of the   cusp.     Estimated left ventricular ejection fraction is 60-65 %.    < end of copied text >    Radiology: all available radiological tests reviewed  < from: CT Abdomen and Pelvis w/ IV Cont (11.20.20 @ 01:12) >  EXAM:  CT ABDOMEN AND PELVIS IC                          EXAM:  CTA CHEST PE PROTOCOL (W)AW IC                            PROCEDURE DATE:  11/20/2020          INTERPRETATION:  CT ANGIO CHEST PULMONARY EMBOLISM WITH IV CONTRAST  CT ABDOMEN AND PELVIS WITH IV CONTRAST    INDICATION: Tachycardia, hypotension and weakness    TECHNIQUE: Contrast enhanced CT imaging of the chest, abdomen and pelvis. Timing optimized for the pulmonary arteries. Sagittal and coronal reformat images are provided. Postprocessed MIP reformatted images were created and reviewed.    90 mL of Omnipaque 350 contrast material was injected IV.    COMPARISON: CT abdomen pelvis 10/5/2003..    FINDINGS:    Pulmonary arteries: No filling defects to suggest pulmonary emboli though detailed evaluation of the subsegmental branches limited secondary to suboptimal contrast bolus within these vessels and respiratory motion artifact.  Aorta: Atherosclerotic disease of the aorta and its branches.    Lines and tubes: None.  Airways:Patent central airways.  Lungs and Pleura: No consolidation, pleural effusion or pneumothorax. Mild bibasilar dependent atelectasis.  Lymph nodes: No mediastinal adenopathy. No hilar or axillary adenopathy.  Heart: Borderline cardiac silhouette. No pericardial effusion.    Liver: Enlarged fatty liver measuring 22 cm in length. Too small to characterize hepatic hypodensities.  Biliary: No dilatation. Partially contracted gallbladder.  Spleen: No suspicious lesions.  Pancreas: No inflammatory changes or ductal dilatation.  Adrenals: Normal.  Kidneys: No hydronephrosis. Too small to characterize bilateral renal hypodensities. Punctate nonobstructive stone in the inferior pole of left kidney.  Vessels: Normal aortic caliber. Atherosclerotic disease of the aorta and its branches.    GI tract: Small hiatal hernia or wall thickening of the distal thoracic esophagus for which nonemergent upper endoscopic evaluation is advised. No evidence of small bowel obstruction. No significant bowel wall thickening or inflammatory changes though detailed evaluation of GI tract is limited secondary to inadequate distention. Mild colonic diverticulosis without evidence of acute diverticular colitis. Normal appendix.    Peritoneum/retroperitoneum and mesentery: No free air. No organized fluid collection. No adenopathy.    Pelvic organs/Bladder: No prostatomegaly. Mild bladder wall thickening, difficult to evaluate secondary to inadequate distention.    Abdominal wall: A small fat containing umbilical andright groin hernia is noted.  Bones and soft tissues: Mild multilevel degenerative changes of the spine noted.    IMPRESSION:    Negative for pulmonary emboli though detailed evaluation of the subsegmental branches limited secondary to suboptimal contrast bolus within these vessels and respiratory motion artifact..    < end of copied text >    Advanced directives addressed: full resuscitation

## 2020-11-23 NOTE — PROGRESS NOTE ADULT - ATTENDING COMMENTS
Cruz Real M.D.  Cardiology, [facility]  Cell: [phone]  Offices:    ([facility] Office)  [phone] ([facility] Office)

## 2020-11-23 NOTE — CDI QUERY NOTE - NSCDIOTHERTXTBX_GEN_ALL_CORE_HH
Patient admitted with Strep Sepsis/ Septic shock    Elevated troponin levels noted :  0.067 > 0.124 > 0.071    Cardiologist documented :  Elevated troponin.  Plan: Minimal elevation in the absence of ischemic symptoms; likely related to shock state.   H+P reveals : Elevated troponin, likely demand ischemia. No EKG change.    Please clarify the diagnosis associated with the elevated troponin levels  a) Demand ischemia likely related to septic shock state  b) No clinical evidence of demand ischemia  c) Troponin levels insignificant  d) other, please clarify

## 2020-11-26 LAB
CULTURE RESULTS: SIGNIFICANT CHANGE UP
CULTURE RESULTS: SIGNIFICANT CHANGE UP
SPECIMEN SOURCE: SIGNIFICANT CHANGE UP
SPECIMEN SOURCE: SIGNIFICANT CHANGE UP

## 2020-11-27 DIAGNOSIS — F17.210 NICOTINE DEPENDENCE, CIGARETTES, UNCOMPLICATED: ICD-10-CM

## 2020-11-27 DIAGNOSIS — J45.909 UNSPECIFIED ASTHMA, UNCOMPLICATED: ICD-10-CM

## 2020-11-27 DIAGNOSIS — I35.1 NONRHEUMATIC AORTIC (VALVE) INSUFFICIENCY: ICD-10-CM

## 2020-11-27 DIAGNOSIS — I34.0 NONRHEUMATIC MITRAL (VALVE) INSUFFICIENCY: ICD-10-CM

## 2020-11-27 DIAGNOSIS — E87.2 ACIDOSIS: ICD-10-CM

## 2020-11-27 DIAGNOSIS — A40.9 STREPTOCOCCAL SEPSIS, UNSPECIFIED: ICD-10-CM

## 2020-11-27 DIAGNOSIS — R65.21 SEVERE SEPSIS WITH SEPTIC SHOCK: ICD-10-CM

## 2020-11-27 DIAGNOSIS — E87.1 HYPO-OSMOLALITY AND HYPONATREMIA: ICD-10-CM

## 2020-12-01 PROBLEM — Z00.00 ENCOUNTER FOR PREVENTIVE HEALTH EXAMINATION: Status: ACTIVE | Noted: 2020-12-01

## 2020-12-02 ENCOUNTER — APPOINTMENT (OUTPATIENT)
Dept: CARDIOLOGY | Facility: CLINIC | Age: 50
End: 2020-12-02
Payer: MEDICAID

## 2020-12-02 VITALS
HEART RATE: 127 BPM | WEIGHT: 161 LBS | DIASTOLIC BLOOD PRESSURE: 86 MMHG | HEIGHT: 67 IN | OXYGEN SATURATION: 97 % | SYSTOLIC BLOOD PRESSURE: 114 MMHG | BODY MASS INDEX: 25.27 KG/M2 | RESPIRATION RATE: 18 BRPM

## 2020-12-02 PROBLEM — J45.909 UNSPECIFIED ASTHMA, UNCOMPLICATED: Chronic | Status: ACTIVE | Noted: 2020-11-19

## 2020-12-02 PROCEDURE — 85610 PROTHROMBIN TIME: CPT | Mod: QW

## 2020-12-02 PROCEDURE — 99203 OFFICE O/P NEW LOW 30 MIN: CPT

## 2020-12-03 LAB
INR PPP: 1.1 RATIO
POCT-PROTHROMBIN TIME: 12.9 SECS
QUALITY CONTROL: YES

## 2020-12-04 ENCOUNTER — APPOINTMENT (OUTPATIENT)
Dept: CARDIOLOGY | Facility: CLINIC | Age: 50
End: 2020-12-04
Payer: MEDICAID

## 2020-12-04 ENCOUNTER — NON-APPOINTMENT (OUTPATIENT)
Age: 50
End: 2020-12-04

## 2020-12-04 VITALS
DIASTOLIC BLOOD PRESSURE: 82 MMHG | HEIGHT: 67 IN | SYSTOLIC BLOOD PRESSURE: 114 MMHG | BODY MASS INDEX: 26.06 KG/M2 | OXYGEN SATURATION: 98 % | HEART RATE: 114 BPM | WEIGHT: 166 LBS

## 2020-12-04 DIAGNOSIS — J45.909 UNSPECIFIED ASTHMA, UNCOMPLICATED: ICD-10-CM

## 2020-12-04 PROCEDURE — 93000 ELECTROCARDIOGRAM COMPLETE: CPT

## 2020-12-04 PROCEDURE — 99215 OFFICE O/P EST HI 40 MIN: CPT | Mod: 25

## 2020-12-05 ENCOUNTER — APPOINTMENT (OUTPATIENT)
Dept: CARDIOLOGY | Facility: CLINIC | Age: 50
End: 2020-12-05
Payer: MEDICAID

## 2020-12-05 VITALS — HEART RATE: 70 BPM | SYSTOLIC BLOOD PRESSURE: 114 MMHG | DIASTOLIC BLOOD PRESSURE: 79 MMHG | RESPIRATION RATE: 14 BRPM

## 2020-12-05 DIAGNOSIS — R79.1 ABNORMAL COAGULATION PROFILE: ICD-10-CM

## 2020-12-05 DIAGNOSIS — I47.2 VENTRICULAR TACHYCARDIA: ICD-10-CM

## 2020-12-05 PROCEDURE — 85610 PROTHROMBIN TIME: CPT | Mod: QW

## 2020-12-05 PROCEDURE — 99213 OFFICE O/P EST LOW 20 MIN: CPT

## 2020-12-07 ENCOUNTER — APPOINTMENT (OUTPATIENT)
Dept: CARDIOLOGY | Facility: CLINIC | Age: 50
End: 2020-12-07
Payer: MEDICAID

## 2020-12-07 PROBLEM — R79.1 SUBTHERAPEUTIC INTERNATIONAL NORMALIZED RATIO (INR): Status: ACTIVE | Noted: 2020-12-07

## 2020-12-07 PROCEDURE — 93306 TTE W/DOPPLER COMPLETE: CPT

## 2020-12-10 ENCOUNTER — APPOINTMENT (OUTPATIENT)
Dept: CARDIOLOGY | Facility: CLINIC | Age: 50
End: 2020-12-10
Payer: MEDICAID

## 2020-12-10 PROCEDURE — 85610 PROTHROMBIN TIME: CPT | Mod: QW

## 2020-12-10 PROCEDURE — 99213 OFFICE O/P EST LOW 20 MIN: CPT

## 2020-12-14 LAB
INR PPP: 1.1 RATIO
INR PPP: 1.4 RATIO
POCT-PROTHROMBIN TIME: 13.1 SECS
POCT-PROTHROMBIN TIME: 17.2 SECS
QUALITY CONTROL: YES
QUALITY CONTROL: YES

## 2020-12-15 ENCOUNTER — APPOINTMENT (OUTPATIENT)
Dept: CARDIOLOGY | Facility: CLINIC | Age: 50
End: 2020-12-15
Payer: MEDICAID

## 2020-12-15 VITALS — DIASTOLIC BLOOD PRESSURE: 68 MMHG | HEART RATE: 100 BPM | SYSTOLIC BLOOD PRESSURE: 105 MMHG | RESPIRATION RATE: 14 BRPM

## 2020-12-15 PROBLEM — J45.909 ASTHMA: Status: ACTIVE | Noted: 2020-12-15

## 2020-12-15 LAB
INR PPP: 2.1 RATIO
POCT-PROTHROMBIN TIME: 24.6 SECS
QUALITY CONTROL: YES

## 2020-12-15 PROCEDURE — 99213 OFFICE O/P EST LOW 20 MIN: CPT

## 2020-12-15 PROCEDURE — 85610 PROTHROMBIN TIME: CPT | Mod: QW

## 2020-12-15 NOTE — ASSESSMENT
[FreeTextEntry1] : a/P:\par \par -Echo next mon\par -chem today\par -Apt next week to review results of testing.\par

## 2020-12-15 NOTE — PHYSICAL EXAM
[General Appearance - Well Developed] : well developed [Normal Appearance] : normal appearance [Well Groomed] : well groomed [General Appearance - Well Nourished] : well nourished [No Deformities] : no deformities [General Appearance - In No Acute Distress] : no acute distress [Normal Conjunctiva] : the conjunctiva exhibited no abnormalities [Eyelids - No Xanthelasma] : the eyelids demonstrated no xanthelasmas [Normal Jugular Venous A Waves Present] : normal jugular venous A waves present [Normal Jugular Venous V Waves Present] : normal jugular venous V waves present [No Jugular Venous Sanders A Waves] : no jugular venous sanders A waves [Respiration, Rhythm And Depth] : normal respiratory rhythm and effort [Exaggerated Use Of Accessory Muscles For Inspiration] : no accessory muscle use [Auscultation Breath Sounds / Voice Sounds] : lungs were clear to auscultation bilaterally [Heart Rate And Rhythm] : heart rate and rhythm were normal [Heart Sounds] : normal S1 and S2 [Murmurs] : no murmurs present [Abdomen Soft] : soft [Abdomen Tenderness] : non-tender [Abdomen Mass (___ Cm)] : no abdominal mass palpated [Abnormal Walk] : normal gait [Gait - Sufficient For Exercise Testing] : the gait was sufficient for exercise testing [Nail Clubbing] : no clubbing of the fingernails [Cyanosis, Localized] : no localized cyanosis [Petechial Hemorrhages (___cm)] : no petechial hemorrhages [Skin Color & Pigmentation] : normal skin color and pigmentation [] : no rash [No Venous Stasis] : no venous stasis [Skin Lesions] : no skin lesions [No Skin Ulcers] : no skin ulcer [No Xanthoma] : no  xanthoma was observed [Oriented To Time, Place, And Person] : oriented to person, place, and time [Affect] : the affect was normal [Mood] : the mood was normal [No Anxiety] : not feeling anxious

## 2020-12-15 NOTE — HISTORY OF PRESENT ILLNESS
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  RUKHSANA w/ severe endocarditis as described above.\par Pt requested opinion of Dr. 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

## 2020-12-18 ENCOUNTER — NON-APPOINTMENT (OUTPATIENT)
Age: 50
End: 2020-12-18

## 2020-12-18 ENCOUNTER — APPOINTMENT (OUTPATIENT)
Dept: CARDIOLOGY | Facility: CLINIC | Age: 50
End: 2020-12-18
Payer: MEDICAID

## 2020-12-18 VITALS
WEIGHT: 177 LBS | BODY MASS INDEX: 27.78 KG/M2 | OXYGEN SATURATION: 99 % | DIASTOLIC BLOOD PRESSURE: 66 MMHG | SYSTOLIC BLOOD PRESSURE: 99 MMHG | HEART RATE: 98 BPM | TEMPERATURE: 98.5 F | HEIGHT: 67 IN

## 2020-12-18 PROCEDURE — 93000 ELECTROCARDIOGRAM COMPLETE: CPT

## 2020-12-18 PROCEDURE — 99215 OFFICE O/P EST HI 40 MIN: CPT

## 2020-12-21 RX ORDER — WARFARIN 2 MG/1
2 TABLET ORAL
Qty: 90 | Refills: 1 | Status: ACTIVE | COMMUNITY
Start: 2020-12-21 | End: 1900-01-01

## 2020-12-22 ENCOUNTER — APPOINTMENT (OUTPATIENT)
Dept: CARDIOLOGY | Facility: CLINIC | Age: 50
End: 2020-12-22
Payer: MEDICAID

## 2020-12-22 VITALS — RESPIRATION RATE: 18 BRPM | DIASTOLIC BLOOD PRESSURE: 70 MMHG | HEART RATE: 104 BPM | SYSTOLIC BLOOD PRESSURE: 100 MMHG

## 2020-12-22 PROCEDURE — 85610 PROTHROMBIN TIME: CPT | Mod: QW

## 2020-12-22 PROCEDURE — 93793 ANTICOAG MGMT PT WARFARIN: CPT

## 2020-12-24 LAB
INR PPP: 1.5 RATIO
POCT-PROTHROMBIN TIME: 18.3 SECS
QUALITY CONTROL: YES

## 2020-12-29 ENCOUNTER — APPOINTMENT (OUTPATIENT)
Dept: CARDIOLOGY | Facility: CLINIC | Age: 50
End: 2020-12-29
Payer: MEDICAID

## 2020-12-29 LAB
INR PPP: 2.2 RATIO
POCT-PROTHROMBIN TIME: 26.6 SECS
QUALITY CONTROL: YES

## 2020-12-29 PROCEDURE — 85610 PROTHROMBIN TIME: CPT | Mod: QW

## 2020-12-29 PROCEDURE — 99213 OFFICE O/P EST LOW 20 MIN: CPT

## 2021-01-05 ENCOUNTER — APPOINTMENT (OUTPATIENT)
Dept: CARDIOLOGY | Facility: CLINIC | Age: 51
End: 2021-01-05
Payer: MEDICAID

## 2021-01-05 VITALS
RESPIRATION RATE: 18 BRPM | OXYGEN SATURATION: 99 % | HEART RATE: 116 BPM | DIASTOLIC BLOOD PRESSURE: 82 MMHG | SYSTOLIC BLOOD PRESSURE: 121 MMHG

## 2021-01-05 DIAGNOSIS — Z79.01 LONG TERM (CURRENT) USE OF ANTICOAGULANTS: ICD-10-CM

## 2021-01-05 DIAGNOSIS — Z79.01 ENCOUNTER FOR THERAPEUTIC DRUG LVL MONITORING: ICD-10-CM

## 2021-01-05 DIAGNOSIS — Z91.89 OTHER SPECIFIED PERSONAL RISK FACTORS, NOT ELSEWHERE CLASSIFIED: ICD-10-CM

## 2021-01-05 DIAGNOSIS — Z95.2 PRESENCE OF PROSTHETIC HEART VALVE: ICD-10-CM

## 2021-01-05 DIAGNOSIS — Z51.81 ENCOUNTER FOR THERAPEUTIC DRUG LVL MONITORING: ICD-10-CM

## 2021-01-05 LAB
INR PPP: 1.3 RATIO
POCT-PROTHROMBIN TIME: 15.4 SECS
QUALITY CONTROL: YES

## 2021-01-05 PROCEDURE — 85610 PROTHROMBIN TIME: CPT | Mod: QW

## 2021-01-05 PROCEDURE — 93793 ANTICOAG MGMT PT WARFARIN: CPT

## 2021-01-05 PROCEDURE — 99072 ADDL SUPL MATRL&STAF TM PHE: CPT

## 2021-01-12 ENCOUNTER — APPOINTMENT (OUTPATIENT)
Dept: CARDIOLOGY | Facility: CLINIC | Age: 51
End: 2021-01-12

## 2021-01-12 RX ORDER — OXYCODONE HYDROCHLORIDE 5 MG/1
5 TABLET ORAL
Qty: 30 | Refills: 0 | Status: COMPLETED | COMMUNITY
Start: 2021-01-12 | End: 2021-01-12

## 2021-01-12 RX ORDER — OXYCODONE 5 MG/1
5 TABLET ORAL
Qty: 21 | Refills: 0 | Status: ACTIVE | COMMUNITY
Start: 2021-01-12 | End: 1900-01-01

## 2021-01-14 ENCOUNTER — APPOINTMENT (OUTPATIENT)
Dept: CARDIOLOGY | Facility: CLINIC | Age: 51
End: 2021-01-14

## 2021-01-20 LAB
ANION GAP SERPL CALC-SCNC: 13 MMOL/L
BUN SERPL-MCNC: 14 MG/DL
CALCIUM SERPL-MCNC: 10.3 MG/DL
CHLORIDE SERPL-SCNC: 99 MMOL/L
CO2 SERPL-SCNC: 26 MMOL/L
CREAT SERPL-MCNC: 0.64 MG/DL
GLUCOSE SERPL-MCNC: 93 MG/DL
POTASSIUM SERPL-SCNC: 4.6 MMOL/L
SODIUM SERPL-SCNC: 138 MMOL/L

## 2021-01-26 ENCOUNTER — APPOINTMENT (OUTPATIENT)
Dept: CARDIOLOGY | Facility: CLINIC | Age: 51
End: 2021-01-26

## 2021-01-26 RX ORDER — ALBUTEROL SULFATE 90 UG/1
108 (90 BASE) AEROSOL, METERED RESPIRATORY (INHALATION)
Qty: 1 | Refills: 1 | Status: DISCONTINUED | COMMUNITY
Start: 2020-12-15 | End: 2021-01-26

## 2021-02-01 ENCOUNTER — APPOINTMENT (OUTPATIENT)
Dept: CARDIOLOGY | Facility: CLINIC | Age: 51
End: 2021-02-01

## 2021-02-09 ENCOUNTER — APPOINTMENT (OUTPATIENT)
Dept: INTERNAL MEDICINE | Facility: CLINIC | Age: 51
End: 2021-02-09

## 2021-03-15 RX ORDER — ALBUTEROL SULFATE 90 UG/1
108 (90 BASE) AEROSOL, METERED RESPIRATORY (INHALATION)
Qty: 1 | Refills: 1 | Status: ACTIVE | COMMUNITY
Start: 2021-01-26 | End: 1900-01-01

## 2021-03-15 RX ORDER — OXYCODONE 5 MG/1
5 TABLET ORAL
Qty: 28 | Refills: 0 | Status: ACTIVE | COMMUNITY
Start: 2021-01-26 | End: 1900-01-01

## 2021-04-03 NOTE — HISTORY OF PRESENT ILLNESS
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

## 2021-04-03 NOTE — ASSESSMENT
[FreeTextEntry1] : \par A/P:\par *IE s/p mitral valve replacement\par & aortic root replacement.\par -cont. BB, lasix\par -plan to wean off after visit in 2 weeks.\par \par \par ===============\par addendum .\par \par Scheduled for visit today \par but informed that high fee for today's visit \par if not seen by PCP 1st.\par Briefly spoke & reviewed meds.\par \par Still w/ some postop pain, oxycodone prescription \par entered to provide pt w/ 3 more weeks of pain control.\par \par Off lasix & BB - pt self dc'd this 2-3 weeks ago.\par Asymptomatic.\par =================\par addendum \par \par Informed 1 week ago that pt had .\par Medical examiner contacted our group through Dr. Hernandez.\par Wife contacted me twice, 1st to inform me later to get medical records.\par Records were sent.\par \par spoke w/ Dr. Espinosa - surgeon - as telehealth visit.\par Per pt, Jesús reccomends no ASA long term,\par only coumadin for 3 months.\par \par Return visit in 3 weeks planned as pain meds\par run out by then, hopefully postop\par pain will have resolved.

## 2022-12-11 NOTE — ED PROVIDER NOTE - NS ED MD TWO NIGHTS YN
Patient tolerated IM toradol injection well.  Mom aware of plan of care regarding reassessment of pain after toradol Yes

## 2024-02-09 NOTE — DISCHARGE NOTE NURSING/CASE MANAGEMENT/SOCIAL WORK - PATIENT PORTAL LINK FT
You can access the FollowMyHealth Patient Portal offered by [facility] by registering at the following website: [email or web address]. By joining [employer]’s FollowMyHealth portal, you will also be able to view your health information using other applications (apps) compatible with our system.
No (0)

## 2024-08-21 NOTE — H&P ADULT - NSICDXPASTMEDICALHX_GEN_ALL_CORE_FT
Detail Level: Detailed Quality 47: Advance Care Plan: Advance Care Planning discussed and documented; advance care plan or surrogate decision maker documented in the medical record. Quality 226: Preventive Care And Screening: Tobacco Use: Screening And Cessation Intervention: Patient screened for tobacco use and is an ex/non-smoker PAST MEDICAL HISTORY:  Asthma
